# Patient Record
Sex: FEMALE | Race: WHITE | Employment: OTHER | URBAN - METROPOLITAN AREA
[De-identification: names, ages, dates, MRNs, and addresses within clinical notes are randomized per-mention and may not be internally consistent; named-entity substitution may affect disease eponyms.]

---

## 2017-10-05 PROBLEM — M81.0 SENILE OSTEOPOROSIS: Status: ACTIVE | Noted: 2017-10-05

## 2018-07-02 ENCOUNTER — APPOINTMENT (OUTPATIENT)
Dept: GENERAL RADIOLOGY | Age: 83
End: 2018-07-02
Attending: EMERGENCY MEDICINE
Payer: MEDICARE

## 2018-07-02 ENCOUNTER — HOSPITAL ENCOUNTER (EMERGENCY)
Age: 83
Discharge: HOME | End: 2018-07-02
Attending: EMERGENCY MEDICINE
Payer: MEDICARE

## 2018-07-02 ENCOUNTER — APPOINTMENT (OUTPATIENT)
Dept: CT IMAGING | Age: 83
End: 2018-07-02
Attending: EMERGENCY MEDICINE
Payer: MEDICARE

## 2018-07-02 VITALS
HEART RATE: 117 BPM | TEMPERATURE: 98 F | SYSTOLIC BLOOD PRESSURE: 136 MMHG | OXYGEN SATURATION: 92 % | HEIGHT: 65 IN | BODY MASS INDEX: 25.99 KG/M2 | DIASTOLIC BLOOD PRESSURE: 64 MMHG | RESPIRATION RATE: 15 BRPM | WEIGHT: 156 LBS

## 2018-07-02 DIAGNOSIS — R40.4 TRANSIENT ALTERATION OF AWARENESS: Primary | ICD-10-CM

## 2018-07-02 LAB
ALBUMIN SERPL-MCNC: 3.4 G/DL (ref 3.2–4.6)
ALBUMIN/GLOB SERPL: 0.9 {RATIO} (ref 1.2–3.5)
ALP SERPL-CCNC: 54 U/L (ref 50–136)
ALT SERPL-CCNC: 29 U/L (ref 12–65)
ANION GAP SERPL CALC-SCNC: 7 MMOL/L (ref 7–16)
AST SERPL-CCNC: 34 U/L (ref 15–37)
BACTERIA URNS QL MICRO: 0 /HPF
BASOPHILS # BLD: 0 K/UL (ref 0–0.2)
BASOPHILS NFR BLD: 0 % (ref 0–2)
BILIRUB SERPL-MCNC: 0.5 MG/DL (ref 0.2–1.1)
BUN SERPL-MCNC: 18 MG/DL (ref 8–23)
CALCIUM SERPL-MCNC: 8.9 MG/DL (ref 8.3–10.4)
CASTS URNS QL MICRO: 0 /LPF
CHLORIDE SERPL-SCNC: 105 MMOL/L (ref 98–107)
CO2 SERPL-SCNC: 29 MMOL/L (ref 21–32)
CREAT SERPL-MCNC: 1.28 MG/DL (ref 0.6–1)
DIFFERENTIAL METHOD BLD: ABNORMAL
EOSINOPHIL # BLD: 0.1 K/UL (ref 0–0.8)
EOSINOPHIL NFR BLD: 1 % (ref 0.5–7.8)
EPI CELLS #/AREA URNS HPF: NORMAL /HPF
ERYTHROCYTE [DISTWIDTH] IN BLOOD BY AUTOMATED COUNT: 13.8 % (ref 11.9–14.6)
GLOBULIN SER CALC-MCNC: 3.6 G/DL (ref 2.3–3.5)
GLUCOSE BLD STRIP.AUTO-MCNC: 188 MG/DL (ref 65–100)
GLUCOSE SERPL-MCNC: 84 MG/DL (ref 65–100)
HCT VFR BLD AUTO: 50.3 % (ref 35.8–46.3)
HGB BLD-MCNC: 16.9 G/DL (ref 11.7–15.4)
IMM GRANULOCYTES # BLD: 0 K/UL (ref 0–0.5)
IMM GRANULOCYTES NFR BLD AUTO: 0 % (ref 0–5)
LACTATE BLD-SCNC: 1.5 MMOL/L (ref 0.5–1.9)
LYMPHOCYTES # BLD: 2.3 K/UL (ref 0.5–4.6)
LYMPHOCYTES NFR BLD: 26 % (ref 13–44)
MAGNESIUM SERPL-MCNC: 2.7 MG/DL (ref 1.8–2.4)
MCH RBC QN AUTO: 32.6 PG (ref 26.1–32.9)
MCHC RBC AUTO-ENTMCNC: 33.6 G/DL (ref 31.4–35)
MCV RBC AUTO: 97.1 FL (ref 79.6–97.8)
MONOCYTES # BLD: 0.7 K/UL (ref 0.1–1.3)
MONOCYTES NFR BLD: 8 % (ref 4–12)
NEUTS SEG # BLD: 5.8 K/UL (ref 1.7–8.2)
NEUTS SEG NFR BLD: 65 % (ref 43–78)
PLATELET # BLD AUTO: 258 K/UL (ref 150–450)
PMV BLD AUTO: 11 FL (ref 10.8–14.1)
POTASSIUM SERPL-SCNC: 4.7 MMOL/L (ref 3.5–5.1)
PROCALCITONIN SERPL-MCNC: <0.1 NG/ML
PROT SERPL-MCNC: 7 G/DL (ref 6.3–8.2)
RBC # BLD AUTO: 5.18 M/UL (ref 4.05–5.25)
RBC #/AREA URNS HPF: NORMAL /HPF
SODIUM SERPL-SCNC: 141 MMOL/L (ref 136–145)
WBC # BLD AUTO: 8.9 K/UL (ref 4.3–11.1)
WBC URNS QL MICRO: NORMAL /HPF

## 2018-07-02 PROCEDURE — 85025 COMPLETE CBC W/AUTO DIFF WBC: CPT | Performed by: EMERGENCY MEDICINE

## 2018-07-02 PROCEDURE — 96360 HYDRATION IV INFUSION INIT: CPT | Performed by: EMERGENCY MEDICINE

## 2018-07-02 PROCEDURE — 83605 ASSAY OF LACTIC ACID: CPT

## 2018-07-02 PROCEDURE — 70450 CT HEAD/BRAIN W/O DYE: CPT

## 2018-07-02 PROCEDURE — 84145 PROCALCITONIN (PCT): CPT | Performed by: EMERGENCY MEDICINE

## 2018-07-02 PROCEDURE — 80053 COMPREHEN METABOLIC PANEL: CPT | Performed by: EMERGENCY MEDICINE

## 2018-07-02 PROCEDURE — 96361 HYDRATE IV INFUSION ADD-ON: CPT | Performed by: EMERGENCY MEDICINE

## 2018-07-02 PROCEDURE — 81003 URINALYSIS AUTO W/O SCOPE: CPT | Performed by: EMERGENCY MEDICINE

## 2018-07-02 PROCEDURE — 93005 ELECTROCARDIOGRAM TRACING: CPT | Performed by: EMERGENCY MEDICINE

## 2018-07-02 PROCEDURE — 81015 MICROSCOPIC EXAM OF URINE: CPT | Performed by: EMERGENCY MEDICINE

## 2018-07-02 PROCEDURE — 99285 EMERGENCY DEPT VISIT HI MDM: CPT | Performed by: EMERGENCY MEDICINE

## 2018-07-02 PROCEDURE — 71046 X-RAY EXAM CHEST 2 VIEWS: CPT

## 2018-07-02 PROCEDURE — 82962 GLUCOSE BLOOD TEST: CPT

## 2018-07-02 PROCEDURE — 83735 ASSAY OF MAGNESIUM: CPT | Performed by: EMERGENCY MEDICINE

## 2018-07-02 PROCEDURE — 74011250636 HC RX REV CODE- 250/636: Performed by: EMERGENCY MEDICINE

## 2018-07-02 RX ADMIN — SODIUM CHLORIDE 1000 ML: 900 INJECTION, SOLUTION INTRAVENOUS at 19:27

## 2018-07-02 NOTE — ED NOTES
Orthostatics completed. Patient denies experiencing any dizziness. Fluid bolus has been started. Cardiac monitoring and cycling vitals in place. Family at bedside. Will continue to monitor.

## 2018-07-02 NOTE — ED PROVIDER NOTES
HPI Comments: Presents with complaint of  Low blood pressure and difficulty getting her words out. Patient was at  Home and her neighbor came over and was visiting with her and noticed that she was having some difficulty with word finding was rubbing her head and said that she didn't feel right so they took her blood pressure and  It was low. Blood pressure 94/59 is the lowest number recorded. Patient has had 5-6 episodes of difficulty with word finding over the past 3 months per family member and primary care provider told family it was likely due to her age and memory. Patient's family member states she tries say something and then pauses and tightens up her mouth and then can come up with the word. Denies any focal weakness. Symptoms have never lasted for a prolonged period of time. She has an appointment with her cardiologist tomorrow. He was at home independently. Patient is a 80 y.o. female presenting with other event. The history is provided by the patient. Other   This is a new problem. The current episode started 3 to 5 hours ago. The problem occurs constantly. The problem has been gradually worsening. Pertinent negatives include no chest pain, no abdominal pain, no headaches and no shortness of breath. Nothing aggravates the symptoms. Nothing relieves the symptoms. She has tried nothing for the symptoms.         Past Medical History:   Diagnosis Date    Abdominal pain, generalized     Acute kidney injury ([address] 12/29/2013    Arthropathy, unspecified, site unspecified     Atrial fibrillation (HCC)     pacemaker    Bell's palsy     Benign hypertensive heart disease without heart failure     Bowel obstruction ([address] 8/20/2011    Chest pain, unspecified     Congestive heart failure, unspecified     Diaphragmatic hernia without mention of obstruction or gangrene     Disorders of bursae and tendons in shoulder region, unspecified     Diverticulosis     Dizziness and giddiness     Dysuria  Edema     Encounter for long-term (current) use of other medications     Endocrine disease     Flat foot(734)     Hemorrhage of rectum and anus     Hyperlipidemia     Hypertension     Hypertonicity of bladder     Hypothyroidism     Loss of height     Lumbago     Nonspecific abnormal finding in stool contents     Osteoporosis, unspecified     Other extrapyramidal disease and abnormal movement disorder     Other specified circulatory system disorders     Painful respiration     Premature menopause     Reflux esophagitis     Restless legs syndrome (RLS)     Slow transit constipation     Stricture and stenosis of esophagus     Thyroid disease     Unspecified gastritis and gastroduodenitis without mention of hemorrhage     Unspecified hemorrhoids with other complication     Unspecified hereditary and idiopathic peripheral neuropathy     Unspecified hypothyroidism     Unspecified menopausal and postmenopausal disorder     Urge incontinence        Past Surgical History:   Procedure Laterality Date    BREAST SURGERY PROCEDURE UNLISTED      cyst    HX APPENDECTOMY      HX CHOLECYSTECTOMY      HX HEENT      HX HYSTERECTOMY      HX PACEMAKER      HX TONSILLECTOMY      NEUROLOGICAL PROCEDURE UNLISTED      carpal tunnel both hands         Family History:   Problem Relation Age of Onset    Cancer Mother      Pancreatic    Heart Disease Mother     Heart Disease Father     Stroke Other     Diabetes Other     Cancer Other     Depression Child        Social History     Social History    Marital status:      Spouse name: N/A    Number of children: N/A    Years of education: N/A     Occupational History    Not on file.      Social History Main Topics    Smoking status: Never Smoker    Smokeless tobacco: Never Used    Alcohol use No    Drug use: No    Sexual activity: Not Currently     Other Topics Concern    Not on file     Social History Narrative         ALLERGIES: Erythromycin and Sulfa (sulfonamide antibiotics)    Review of Systems   Constitutional: Negative for chills and fever. Respiratory: Negative for shortness of breath. Cardiovascular: Negative for chest pain. Gastrointestinal: Negative for abdominal pain. Skin: Negative for rash and wound. Neurological: Negative for headaches. All other systems reviewed and are negative. Vitals:    07/02/18 1605 07/02/18 1813   BP: 113/72 115/74   Pulse: 83    Resp: 15    Temp: 98 °F (36.7 °C)    SpO2: 94% 95%   Weight: 70.8 kg (156 lb)    Height: 5' 5\" (1.651 m)             Physical Exam   Constitutional: She is oriented to person, place, and time. She appears well-developed and well-nourished. No distress. HENT:   Head: Normocephalic and atraumatic. Neck: Normal range of motion. Neck supple. Cardiovascular: Normal rate and regular rhythm. Murmur heard. Pulmonary/Chest: Effort normal and breath sounds normal. No respiratory distress. She has no wheezes. She has no rales. Abdominal: Soft. She exhibits no distension. There is no tenderness. There is no rebound and no guarding. Musculoskeletal: Normal range of motion. She exhibits no edema or tenderness. Neurological: She is alert and oriented to person, place, and time. No cranial nerve deficit. Coordination normal.   Skin: Skin is warm and dry. No rash noted. She is not diaphoretic. No erythema. Psychiatric: She has a normal mood and affect. Her behavior is normal.   Nursing note and vitals reviewed. MDM  Number of Diagnoses or Management Options  Transient alteration of awareness:   Diagnosis management comments: Patient states she's had a murmur in the past.  Her workup is negative except for mildly dehydrated as reflected by an elevated hemoglobin. She has refused Coumadin in the past.  She has an appointment with her cardiologist tomorrow. I am unsure if he's an echo this since 2013.   I discussed with the family that these symptoms have been going on intermittently for several months that the workup for TIAs can be done as an outpatient. This included an echo carotid Dopplers and MRI. she has a history of atrial fibrillation  Which her EKG shows currently. SHe is on a full dose aspirin but may need Plavix or something in addition  For anticoagulation given her refusal of Coumadin but  Probable TIAs. Again this is discussed with the family.   I think admission for TIA is not indicated if she is not going to take blood thinners plus she has 24 hour follow up       Amount and/or Complexity of Data Reviewed  Clinical lab tests: ordered and reviewed  Review and summarize past medical records: yes  Independent visualization of images, tracings, or specimens: yes    Risk of Complications, Morbidity, and/or Mortality  Presenting problems: high  Diagnostic procedures: moderate  Management options: high    Patient Progress  Patient progress: improved        ED Course       Procedures

## 2018-07-02 NOTE — ED TRIAGE NOTES
Patients daughter states the patients BP has been up and down all day today. Patient has a pacemaker and has a history of afib. Patients daughter states that she has had issues speaking a couple times today and that has been going on for about 3 months. Patient has no complaints at this time.

## 2018-07-03 LAB
ATRIAL RATE: 340 BPM
CALCULATED R AXIS, ECG10: 50 DEGREES
CALCULATED T AXIS, ECG11: -7 DEGREES
DIAGNOSIS, 93000: NORMAL
Q-T INTERVAL, ECG07: 334 MS
QRS DURATION, ECG06: 86 MS
QTC CALCULATION (BEZET), ECG08: 439 MS
VENTRICULAR RATE, ECG03: 104 BPM

## 2018-07-03 NOTE — DISCHARGE INSTRUCTIONS
Transient Ischemic Attack: After Your Visit to the Emergency Room  Your Care Instructions  Even though you have been released from the emergency room, you still need to watch for any problems. The doctor carefully checked you. But sometimes problems can develop later. If you have new symptoms, or if your symptoms do not get better, return to the emergency room or call your doctor right away. In a transient ischemic attack (TIA), the blood flow to your brain is blocked for a short time. You may have had vision problems, slurred speech, or other symptoms that are now gone. A stroke can cause symptoms similar to a TIA, but it causes lasting damage to your brain. A TIA is a warning that you may have a stroke in the future. If you think you are having another TIA, call 911 or seek other emergency help right away. Early treatment can help prevent a stroke. If you have other new symptoms, or if your symptoms do not get better, return to the emergency room or call your doctor right away. Prompt treatment may prevent long-term brain damage caused by a stroke. A visit to the emergency room is only one step in your treatment. Even if you feel better, you still need to do what your doctor recommends, such as going to all suggested follow-up appointments and taking medicines as directed. This will help you recover and help prevent future problems. How can you care for yourself at home? · Know the signs of a TIA or stroke. Ask family members or caregivers to watch for signs of a stroke, and make sure they know what to do if these signs appear. They may notice these signs before you do. · Take your medicines exactly as prescribed. Call your doctor if you think you are having a problem with your medicine. · Your doctor may advise you to take aspirin every day. Aspirin helps prevent strokes in people who have had a TIA.   · Do not take any over-the-counter medicines or herbal products without talking to your doctor first.  When should you call for help? Call 911 if:  · You have signs of a stroke, even if these symptoms go away after a few minutes. These may include:  ¨ Sudden numbness, paralysis, or weakness in your face, arm, or leg, especially on only one side of your body. ¨ New problems with walking or balance. ¨ Sudden vision changes. ¨ Drooling or slurred speech. ¨ New problems speaking or understanding simple statements, or feeling confused. ¨ A sudden, severe headache that is different from past headaches. · You feel like you are having another TIA. · You have signs of internal bleeding, especially if you are taking medicine to prevent blood clots, such as warfarin (Coumadin) or aspirin. These include:  ¨ You cough up blood. ¨ You vomit blood or what looks like coffee grounds. ¨ You pass maroon or very bloody stools. · You have other symptoms that you think are a medical emergency. Return to the emergency room now if:  · You have new symptoms that may be related to a TIA, such as falls or trouble swallowing. · You are taking aspirin or other medicine that prevents blood clots, and you have:  ¨ Severe vaginal bleeding. You are passing clots of blood and soaking through your usual pads or tampons every hour for 2 or more hours. ¨ New bruises or blood spots under your skin. ¨ A nosebleed. ¨ Bleeding gums when you brush your teeth. ¨ Blood in your urine. Where can you learn more? Go to [employer]  Enter W424 in the search box to learn more about \"Transient Ischemic Attack: After Your Visit to the Emergency Room. \"   © 4691-3530 [employer]. Care instructions adapted under license by [employer] (which disclaims liability or warranty for this information).  This care instruction is for use with your licensed healthcare professional. If you have questions about a medical condition or this instruction, always ask your healthcare professional. Tessie Pacheco disclaims any warranty or liability for your use of this information. Content Version: 9.4.69601;  Last Revised: October 31, 2011

## 2018-07-03 NOTE — ED NOTES
I have reviewed discharge instructions with the patient and caregiver. The patient and caregiver verbalized understanding. Patient left ED via Discharge Method: ambulatory to Home with self transport. The patient is ambulatory upon exit and appears in no acute distress. The patient has been provided discharge instructions and follow up information. The patient and daughter do not have any questions at this time. Opportunity for questions and clarification provided. Patient given 0 scripts. To continue your aftercare when you leave the hospital, you may receive an automated call from our care team to check in on how you are doing. This is a free service and part of our promise to provide the best care and service to meet your aftercare needs.  If you have questions, or wish to unsubscribe from this service please call [phone]. Thank you for Choosing our [employer] Emergency Department.

## 2020-02-15 ENCOUNTER — HOSPITAL ENCOUNTER (EMERGENCY)
Age: 85
Discharge: HOME | End: 2020-02-15
Attending: EMERGENCY MEDICINE
Payer: MEDICARE

## 2020-02-15 ENCOUNTER — APPOINTMENT (OUTPATIENT)
Dept: GENERAL RADIOLOGY | Age: 85
End: 2020-02-15
Attending: EMERGENCY MEDICINE
Payer: MEDICARE

## 2020-02-15 VITALS
OXYGEN SATURATION: 93 % | DIASTOLIC BLOOD PRESSURE: 79 MMHG | BODY MASS INDEX: 27.16 KG/M2 | SYSTOLIC BLOOD PRESSURE: 114 MMHG | HEART RATE: 108 BPM | WEIGHT: 163 LBS | HEIGHT: 65 IN | RESPIRATION RATE: 20 BRPM | TEMPERATURE: 98.1 F

## 2020-02-15 DIAGNOSIS — J18.9 ATYPICAL PNEUMONIA: ICD-10-CM

## 2020-02-15 DIAGNOSIS — I50.9 ACUTE ON CHRONIC CONGESTIVE HEART FAILURE, UNSPECIFIED HEART FAILURE TYPE (HCC): Primary | ICD-10-CM

## 2020-02-15 LAB
ALBUMIN SERPL-MCNC: 3.5 G/DL (ref 3.2–4.6)
ALBUMIN/GLOB SERPL: 0.9 {RATIO} (ref 1.2–3.5)
ALP SERPL-CCNC: 67 U/L (ref 50–136)
ALT SERPL-CCNC: 36 U/L (ref 12–65)
ANION GAP SERPL CALC-SCNC: 8 MMOL/L (ref 7–16)
AST SERPL-CCNC: 35 U/L (ref 15–37)
BASOPHILS # BLD: 0.1 K/UL (ref 0–0.2)
BASOPHILS NFR BLD: 1 % (ref 0–2)
BILIRUB SERPL-MCNC: 0.8 MG/DL (ref 0.2–1.1)
BNP SERPL-MCNC: 1922 PG/ML
BUN SERPL-MCNC: 17 MG/DL (ref 8–23)
CALCIUM SERPL-MCNC: 9.4 MG/DL (ref 8.3–10.4)
CHLORIDE SERPL-SCNC: 103 MMOL/L (ref 98–107)
CO2 SERPL-SCNC: 28 MMOL/L (ref 21–32)
CREAT SERPL-MCNC: 1.39 MG/DL (ref 0.6–1)
DIFFERENTIAL METHOD BLD: ABNORMAL
EOSINOPHIL # BLD: 0.2 K/UL (ref 0–0.8)
EOSINOPHIL NFR BLD: 2 % (ref 0.5–7.8)
ERYTHROCYTE [DISTWIDTH] IN BLOOD BY AUTOMATED COUNT: 13.3 % (ref 11.9–14.6)
GLOBULIN SER CALC-MCNC: 3.8 G/DL (ref 2.3–3.5)
GLUCOSE SERPL-MCNC: 193 MG/DL (ref 65–100)
HCT VFR BLD AUTO: 54.9 % (ref 35.8–46.3)
HGB BLD-MCNC: 17.6 G/DL (ref 11.7–15.4)
IMM GRANULOCYTES # BLD AUTO: 0.1 K/UL (ref 0–0.5)
IMM GRANULOCYTES NFR BLD AUTO: 0 % (ref 0–5)
LYMPHOCYTES # BLD: 1.8 K/UL (ref 0.5–4.6)
LYMPHOCYTES NFR BLD: 15 % (ref 13–44)
MCH RBC QN AUTO: 31.4 PG (ref 26.1–32.9)
MCHC RBC AUTO-ENTMCNC: 32.1 G/DL (ref 31.4–35)
MCV RBC AUTO: 98 FL (ref 79.6–97.8)
MONOCYTES # BLD: 0.7 K/UL (ref 0.1–1.3)
MONOCYTES NFR BLD: 6 % (ref 4–12)
NEUTS SEG # BLD: 9.5 K/UL (ref 1.7–8.2)
NEUTS SEG NFR BLD: 77 % (ref 43–78)
NRBC # BLD: 0 K/UL (ref 0–0.2)
PLATELET # BLD AUTO: 266 K/UL (ref 150–450)
PMV BLD AUTO: 10.8 FL (ref 9.4–12.3)
POTASSIUM SERPL-SCNC: 4.6 MMOL/L (ref 3.5–5.1)
PROT SERPL-MCNC: 7.3 G/DL (ref 6.3–8.2)
RBC # BLD AUTO: 5.6 M/UL (ref 4.05–5.2)
SODIUM SERPL-SCNC: 139 MMOL/L (ref 136–145)
WBC # BLD AUTO: 12.3 K/UL (ref 4.3–11.1)

## 2020-02-15 PROCEDURE — 99284 EMERGENCY DEPT VISIT MOD MDM: CPT

## 2020-02-15 PROCEDURE — 74011000250 HC RX REV CODE- 250: Performed by: EMERGENCY MEDICINE

## 2020-02-15 PROCEDURE — 96374 THER/PROPH/DIAG INJ IV PUSH: CPT

## 2020-02-15 PROCEDURE — 71045 X-RAY EXAM CHEST 1 VIEW: CPT

## 2020-02-15 PROCEDURE — 85025 COMPLETE CBC W/AUTO DIFF WBC: CPT

## 2020-02-15 PROCEDURE — 80053 COMPREHEN METABOLIC PANEL: CPT

## 2020-02-15 PROCEDURE — 74011250636 HC RX REV CODE- 250/636: Performed by: EMERGENCY MEDICINE

## 2020-02-15 PROCEDURE — 83880 ASSAY OF NATRIURETIC PEPTIDE: CPT

## 2020-02-15 PROCEDURE — 94640 AIRWAY INHALATION TREATMENT: CPT

## 2020-02-15 RX ORDER — FUROSEMIDE 10 MG/ML
20 INJECTION INTRAMUSCULAR; INTRAVENOUS
Status: COMPLETED | OUTPATIENT
Start: 2020-02-15 | End: 2020-02-15

## 2020-02-15 RX ORDER — LEVOFLOXACIN 750 MG/1
750 TABLET ORAL DAILY
Qty: 7 TAB | Refills: 0 | Status: SHIPPED | OUTPATIENT
Start: 2020-02-15 | End: 2020-05-20 | Stop reason: ALTCHOICE

## 2020-02-15 RX ORDER — LEVALBUTEROL INHALATION SOLUTION 1.25 MG/3ML
1.25 SOLUTION RESPIRATORY (INHALATION)
Status: COMPLETED | OUTPATIENT
Start: 2020-02-15 | End: 2020-02-15

## 2020-02-15 RX ADMIN — LEVALBUTEROL HYDROCHLORIDE 1.25 MG: 1.25 SOLUTION RESPIRATORY (INHALATION) at 04:07

## 2020-02-15 RX ADMIN — FUROSEMIDE 20 MG: 10 INJECTION, SOLUTION INTRAMUSCULAR; INTRAVENOUS at 04:23

## 2020-02-15 NOTE — CONSULTS
Hospitalist Consult Note    History of Present Illness:  51-year-old female presenting to the emergency department via EMS after waking up acutely short of breath in the middle of the night. Patient states that she has had a 2-week course of generalized malaise and nonproductive cough. Patient denies fever, chills, cold sweats, nausea, vomiting, diarrhea, rash pleuritic chest pain, exertional chest pain, dyspnea on exertion, orthopnea, PND, lower extremity edema. In the emergency department the patient was noted to have heart rate of 103, blood pressure of 137/64, oxygen saturations greater than 93% on room air, no fever. Very mild leukocytosis at 12.3 with neutrophil predominance. BNP of 1920 but no prior baseline for comparison. Creatinine within the range of normal.  Patient was treated with furosemide IV. Comprehensive review of systems negative unless stated above. Past Medical History:  Paroxysmal atrial fibrillation on rivaroxaban, amiodarone with pacemaker  History of HFpEF  HTN  HLD  Hypothyroidism    Past Surgical History:  Pacemaker placement  Hysterectomy  Cholecystectomy  Appendectomy  Tonsillectomy    Family History: Mother: Pancreatic cancer, CAD  Father: CAD    Social History:  Lives at home by herself, retired,   Never smoker, denies alcohol or illicits    Physical Exam:  General: Elderly white female, sitting up in bed, no acute distress  HEENT: NCAT, moist mucous membranes  Skin: No rash noted  Cardio: RRR, normal S1/S2, no rubs, no gallops, no murmurs, no JVD  Pulm: Non labored respirations on room air, LCAB, no wheezing, no rales, no rhonchi  GI: Soft, Nt, Nd, Nml bowel sounds, no masses noted  Extremity: Atraumatic, no deformities, no edema  Neuro: Alert, oriented, moving all extremities, no focal deficits noted  Psych: Pleasant, cooperative, normal range of affect    I have personally reviewed all current data for this patient.     Assessment and Plan:    #Interstitial pneumonia: Patient presented with a two-week history of productive cough and acute onset of shortness of breath this morning. Patient's vital signs are unremarkable including normal oxygen saturation on room air. Physical exam with clear lungs, no JVD, no lower extremity edema, frequent dry cough. Chest x-ray with asymmetrical interstitial opacities on the right consistent with interstitial pneumonia.   Mild leukocytosis with neutrophil predominance.  -Patient is safe for discharge and close outpatient follow-up with primary care physician on Monday  -Patient given very strict return precautions to come back to the emergency department if she has worsening shortness of breath, chest pain  -Patient has a neighbor who keeps a close eye on her and a daughter who lives in the immediate area  -Discussed with emergency physician agrees to send the patient home with levofloxacin for interstitial pneumonia  -Discussed the discharge plan at length with the patient, daughter, neighbor and all voiced understanding and agreement    Safe for discharge home

## 2020-02-15 NOTE — DISCHARGE INSTRUCTIONS
Patient Education        Pneumonia: Care Instructions  Your Care Instructions    Pneumonia is an infection of the lungs. Most cases are caused by infections from bacteria or viruses. Pneumonia may be mild or very severe. If it is caused by bacteria, you will be treated with antibiotics. It may take a few weeks to a few months to recover fully from pneumonia, depending on how sick you were and whether your overall health is good. Follow-up care is a key part of your treatment and safety. Be sure to make and go to all appointments, and call your doctor if you are having problems. It's also a good idea to know your test results and keep a list of the medicines you take. How can you care for yourself at home? · Take your antibiotics exactly as directed. Do not stop taking the medicine just because you are feeling better. You need to take the full course of antibiotics. · Take your medicines exactly as prescribed. Call your doctor if you think you are having a problem with your medicine. · Get plenty of rest and sleep. You may feel weak and tired for a while, but your energy level will improve with time. · To prevent dehydration, drink plenty of fluids, enough so that your urine is light yellow or clear like water. Choose water and other caffeine-free clear liquids until you feel better. If you have kidney, heart, or liver disease and have to limit fluids, talk with your doctor before you increase the amount of fluids you drink. · Take care of your cough so you can rest. A cough that brings up mucus from your lungs is common with pneumonia. It is one way your body gets rid of the infection. But if coughing keeps you from resting or causes severe fatigue and chest-wall pain, talk to your doctor. He or she may suggest that you take a medicine to reduce the cough. · Use a vaporizer or humidifier to add moisture to your bedroom. Follow the directions for cleaning the machine.   · Do not smoke or allow others to smoke around you. Smoke will make your cough last longer. If you need help quitting, talk to your doctor about stop-smoking programs and medicines. These can increase your chances of quitting for good. · Take an over-the-counter pain medicine, such as acetaminophen (Tylenol), ibuprofen (Advil, Motrin), or naproxen (Aleve). Read and follow all instructions on the label. · Do not take two or more pain medicines at the same time unless the doctor told you to. Many pain medicines have acetaminophen, which is Tylenol. Too much acetaminophen (Tylenol) can be harmful. · If you were given a spirometer to measure how well your lungs are working, use it as instructed. This can help your doctor tell how your recovery is going. · To prevent pneumonia in the future, talk to your doctor about getting a flu vaccine (once a year) and a pneumococcal vaccine (one time only for most people). When should you call for help? Call 911 anytime you think you may need emergency care. For example, call if:    · You have severe trouble breathing.    Call your doctor now or seek immediate medical care if:    · You cough up dark brown or bloody mucus (sputum).     · You have new or worse trouble breathing.     · You are dizzy or lightheaded, or you feel like you may faint.    Watch closely for changes in your health, and be sure to contact your doctor if:    · You have a new or higher fever.     · You are coughing more deeply or more often.     · You are not getting better after 2 days (48 hours).     · You do not get better as expected. Where can you learn more? Go to http://priscila-manuel.info/. Enter [id number] in the search box to learn more about \"Pneumonia: Care Instructions. \"  Current as of: June 9, 2019  Content Version: 12.2  © 3994-4140 [employer]. Care instructions adapted under license by [employer] (which disclaims liability or warranty for this information).  If you have questions about a medical condition or this instruction, always ask your healthcare professional. [place] [address] any warranty or liability for your use of this information.

## 2020-02-15 NOTE — ED PROVIDER NOTES
59-year-old female with a history of chronic kidney disease, A. fib and congestive heart failure presenting for worsening shortness of breath. With cough    The history is provided by the patient. Cough   This is a new problem. The current episode started more than 1 week ago. The problem occurs constantly. The problem has been gradually worsening. The cough is non-productive. There has been no fever. The fever has been present for less than 1 day. Associated symptoms include shortness of breath. Pertinent negatives include no chest pain, no chills, no sweats, no weight loss, no eye redness, no ear congestion, no ear pain, no headaches, no rhinorrhea, no sore throat, no myalgias, no wheezing, no nausea, no vomiting and no confusion. She has tried nothing for the symptoms. The treatment provided no relief. She is not a smoker. Her past medical history is significant for CHF.         Past Medical History:   Diagnosis Date    Acute kidney injury ([facility] [address] 12/29/2013    Atrial fibrillation (HCC)     pacemaker    Bell's palsy     Benign hypertensive heart disease without heart failure     Bowel obstruction (HCC) 8/20/2011    Congestive heart failure, unspecified     Diverticulosis     Flat foot(734)     Hemorrhage of rectum and anus     Hiatal hernia     Hyperlipidemia     Hypertension     Hypertonicity of bladder     Hypothyroidism     Loss of height     Menopause     Osteoporosis     Reflux esophagitis     Restless legs syndrome (RLS)     Slow transit constipation     Stricture and stenosis of esophagus     Unspecified gastritis and gastroduodenitis without mention of hemorrhage     Unspecified hemorrhoids with other complication     Unspecified hereditary and idiopathic peripheral neuropathy     Urge incontinence        Past Surgical History:   Procedure Laterality Date    BREAST SURGERY PROCEDURE UNLISTED      cyst    HX APPENDECTOMY      HX CHOLECYSTECTOMY      HX HEENT      HX HYSTERECTOMY      HX PACEMAKER      HX TONSILLECTOMY      NEUROLOGICAL PROCEDURE UNLISTED      carpal tunnel both hands         Family History:   Problem Relation Age of Onset    Cancer Mother         Pancreatic    Heart Disease Mother     Heart Disease Father     Stroke Other     Diabetes Other     Cancer Other     Depression Child        Social History     Socioeconomic History    Marital status:      Spouse name: Not on file    Number of children: Not on file    Years of education: Not on file    Highest education level: Not on file   Occupational History    Not on file   Social Needs    Financial resource strain: Not on file    Food insecurity:     Worry: Not on file     Inability: Not on file    Transportation needs:     Medical: Not on file     Non-medical: Not on file   Tobacco Use    Smoking status: Never Smoker    Smokeless tobacco: Never Used   Substance and Sexual Activity    Alcohol use: No    Drug use: No    Sexual activity: Not Currently   Lifestyle    Physical activity:     Days per week: Not on file     Minutes per session: Not on file    Stress: Not on file   Relationships    Social connections:     Talks on phone: Not on file     Gets together: Not on file     Attends Scientologist service: Not on file     Active member of club or organization: Not on file     Attends meetings of clubs or organizations: Not on file     Relationship status: Not on file    Intimate partner violence:     Fear of current or ex partner: Not on file     Emotionally abused: Not on file     Physically abused: Not on file     Forced sexual activity: Not on file   Other Topics Concern    Not on file   Social History Narrative    Not on file         ALLERGIES: Erythromycin and Sulfa (sulfonamide antibiotics)    Review of Systems   Constitutional: Negative for chills and weight loss. HENT: Negative for ear pain, rhinorrhea and sore throat. Eyes: Negative for redness.    Respiratory: Positive for cough and shortness of breath. Negative for wheezing. Cardiovascular: Negative for chest pain. Gastrointestinal: Negative for nausea and vomiting. Musculoskeletal: Negative for myalgias. Neurological: Negative for headaches. Psychiatric/Behavioral: Negative for confusion. All other systems reviewed and are negative. Vitals:    02/15/20 0309 02/15/20 0409 02/15/20 0423   BP: 164/80  137/64   Pulse: 99  (!) 103   Resp: 20     Temp: 98.1 °F (36.7 °C)     SpO2: 97% 95%    Weight: 73.9 kg (163 lb)     Height: 5' 5\" (1.651 m)              Physical Exam  Vitals signs and nursing note reviewed. Constitutional:       Appearance: She is well-developed. HENT:      Head: Normocephalic and atraumatic. Eyes:      Conjunctiva/sclera: Conjunctivae normal.      Pupils: Pupils are equal, round, and reactive to light. Neck:      Musculoskeletal: Neck supple. Cardiovascular:      Rate and Rhythm: Normal rate and regular rhythm. Pulmonary:      Effort: Pulmonary effort is normal.      Breath sounds: Normal breath sounds. Comments: coarse breath sounds bilaterally  Abdominal:      General: Bowel sounds are normal.      Palpations: Abdomen is soft. Musculoskeletal: Normal range of motion. Skin:     General: Skin is warm and dry. Neurological:      Mental Status: She is alert and oriented to person, place, and time. MDM  Number of Diagnoses or Management Options  Acute on chronic congestive heart failure, unspecified heart failure type [facility]):   Diagnosis management comments: 51-year-old female presenting for worsening shortness of breath.   EKG shows the patient's in chronic A. fib with a rate of 95  Check basic labs and a chest x-ray       Amount and/or Complexity of Data Reviewed  Clinical lab tests: ordered and reviewed (Results for orders placed or performed during the hospital encounter of 02/15/20  -CBC WITH AUTOMATED DIFF       Result                      Value             Ref Range           WBC                         12.3 (H)          4.3 - 11.1 K*       RBC                         5.60 (H)          4.05 - 5.2 M*       HGB                         17.6 (H)          11.7 - 15.4 *       HCT                         54.9 (H)          35.8 - 46.3 %       MCV                         98.0 (H)          79.6 - 97.8 *       MCH                         31.4              26.1 - 32.9 *       MCHC                        32.1              31.4 - 35.0 *       RDW                         13.3              11.9 - 14.6 %       PLATELET                    266               150 - 450 K/*       MPV                         10.8              9.4 - 12.3 FL       ABSOLUTE NRBC               0.00              0.0 - 0.2 K/*       DF                          AUTOMATED                             NEUTROPHILS                 77                43 - 78 %           LYMPHOCYTES                 15                13 - 44 %           MONOCYTES                   6                 4.0 - 12.0 %        EOSINOPHILS                 2                 0.5 - 7.8 %         BASOPHILS                   1                 0.0 - 2.0 %         IMMATURE GRANULOCYTES       0                 0.0 - 5.0 %         ABS. NEUTROPHILS            9.5 (H)           1.7 - 8.2 K/*       ABS. LYMPHOCYTES            1.8               0.5 - 4.6 K/*       ABS. MONOCYTES              0.7               0.1 - 1.3 K/*       ABS. EOSINOPHILS            0.2               0.0 - 0.8 K/*       ABS. BASOPHILS              0.1               0.0 - 0.2 K/*       ABS. IMM.  GRANS.            0.1               0.0 - 0.5 K/*  -METABOLIC PANEL, COMPREHENSIVE       Result                      Value             Ref Range           Sodium                      139               136 - 145 mm*       Potassium                   4.6               3.5 - 5.1 mm*       Chloride                    103               98 - 107 mmo*       CO2                         28                21 - 32 mmol*       Anion gap                   8                 7 - 16 mmol/L       Glucose                     193 (H)           65 - 100 mg/*       BUN                         17                8 - 23 MG/DL        Creatinine                  1.39 (H)          0.6 - 1.0 MG*       GFR est AA                  45 (L)            >60 ml/min/1*       GFR est non-AA              37 (L)            >60 ml/min/1*       Calcium                     9.4               8.3 - 10.4 M*       Bilirubin, total            0.8               0.2 - 1.1 MG*       ALT (SGPT)                  36                12 - 65 U/L         AST (SGOT)                  35                15 - 37 U/L         Alk. phosphatase            67                50 - 136 U/L        Protein, total              7.3               6.3 - 8.2 g/*       Albumin                     3.5               3.2 - 4.6 g/*       Globulin                    3.8 (H)           2.3 - 3.5 g/*       A-G Ratio                   0.9 (L)           1.2 - 3.5      -NT-PRO BNP       Result                      Value             Ref Range           NT pro-BNP                  1,922 (H)         <450 PG/ML     )  Tests in the radiology section of CPT®: ordered and reviewed  Decide to obtain previous medical records or to obtain history from someone other than the patient: yes  Discuss the patient with other providers: yes (Discussed the case with the hospitalist who will assume care)  Independent visualization of images, tracings, or specimens: yes    Risk of Complications, Morbidity, and/or Mortality  Presenting problems: high  Diagnostic procedures: high  Management options: high  General comments: I personally reviewed the patient's vital signs, laboratory tests, and/or radiological findings. I discussed these findings with the patient and their significance.   I answered all questions and explained that given these findings there is significant concern for increased morbidity and/or mortality without immediate intervention. As a result, I recommended admission to the hospital, consulted the appropriate service, and transitioned care to that service in improved condition      Patient Progress  Patient progress: improved    ED Course as of Feb 15 0429   Sat Feb 15, 2020   0356 Patient slightly hemoconcentrated. [JS]   2201 Reviewed the patient's chest x-ray and she has significant pulmonary edema likely the source of her shortness of breath.     [JS]      ED Course User Index  [JS] Jerson Owen MD       Procedures

## 2020-02-15 NOTE — ED TRIAGE NOTES
Pt arrives from home via [facility] with daughter and neighbor with c/o cough that has lasted 2-3 months. Pt called daughter and told her she was having trouble breathing. On EMS arrival, pt had an SaO2 of 92% and pt was speaking in full sentences. Pt was placed on O2 for comfort, SaO2 at 96%. Pt presents with no obvious work of breathing, chest rise symmetrical, SaO2 94% on room air,pt has slight non-productive cough, lung field cleat jagjit.

## 2020-02-15 NOTE — ED NOTES
I have reviewed discharge instructions with the patient. The patient verbalized understanding. Patient left ED via Discharge Method: wheelchair to Home with family. Opportunity for questions and clarification provided. Patient given 1 scripts. To continue your aftercare when you leave the hospital, you may receive an automated call from our care team to check in on how you are doing. This is a free service and part of our promise to provide the best care and service to meet your aftercare needs.  If you have questions, or wish to unsubscribe from this service please call [phone]. Thank you for Choosing our [address] [place] [address] Emergency Department.

## 2020-03-16 ENCOUNTER — APPOINTMENT (OUTPATIENT)
Dept: GENERAL RADIOLOGY | Age: 85
End: 2020-03-16
Attending: EMERGENCY MEDICINE
Payer: MEDICARE

## 2020-03-16 ENCOUNTER — HOSPITAL ENCOUNTER (EMERGENCY)
Age: 85
Discharge: HOME | End: 2020-03-16
Attending: EMERGENCY MEDICINE
Payer: MEDICARE

## 2020-03-16 VITALS
OXYGEN SATURATION: 97 % | BODY MASS INDEX: 26.2 KG/M2 | WEIGHT: 163 LBS | HEART RATE: 100 BPM | HEIGHT: 66 IN | SYSTOLIC BLOOD PRESSURE: 136 MMHG | TEMPERATURE: 98.1 F | DIASTOLIC BLOOD PRESSURE: 76 MMHG | RESPIRATION RATE: 21 BRPM

## 2020-03-16 DIAGNOSIS — M54.9 MECHANICAL BACK PAIN: Primary | ICD-10-CM

## 2020-03-16 LAB
ANION GAP SERPL CALC-SCNC: 5 MMOL/L (ref 7–16)
BACTERIA URNS QL MICRO: 0 /HPF
BASOPHILS # BLD: 0 K/UL (ref 0–0.2)
BASOPHILS NFR BLD: 0 % (ref 0–2)
BUN SERPL-MCNC: 17 MG/DL (ref 8–23)
CALCIUM SERPL-MCNC: 8.9 MG/DL (ref 8.3–10.4)
CASTS URNS QL MICRO: NORMAL /LPF
CHLORIDE SERPL-SCNC: 107 MMOL/L (ref 98–107)
CO2 SERPL-SCNC: 27 MMOL/L (ref 21–32)
CREAT SERPL-MCNC: 1.15 MG/DL (ref 0.6–1)
CRP SERPL-MCNC: 0.6 MG/DL (ref 0–0.9)
CRYSTALS URNS QL MICRO: 0 /LPF
DIFFERENTIAL METHOD BLD: ABNORMAL
EOSINOPHIL # BLD: 0.1 K/UL (ref 0–0.8)
EOSINOPHIL NFR BLD: 1 % (ref 0.5–7.8)
EPI CELLS #/AREA URNS HPF: NORMAL /HPF
ERYTHROCYTE [DISTWIDTH] IN BLOOD BY AUTOMATED COUNT: 13.5 % (ref 11.9–14.6)
GLUCOSE SERPL-MCNC: 139 MG/DL (ref 65–100)
HCT VFR BLD AUTO: 47.7 % (ref 35.8–46.3)
HGB BLD-MCNC: 15.8 G/DL (ref 11.7–15.4)
IMM GRANULOCYTES # BLD AUTO: 0.1 K/UL (ref 0–0.5)
IMM GRANULOCYTES NFR BLD AUTO: 1 % (ref 0–5)
LYMPHOCYTES # BLD: 1 K/UL (ref 0.5–4.6)
LYMPHOCYTES NFR BLD: 10 % (ref 13–44)
MCH RBC QN AUTO: 31.9 PG (ref 26.1–32.9)
MCHC RBC AUTO-ENTMCNC: 33.1 G/DL (ref 31.4–35)
MCV RBC AUTO: 96.2 FL (ref 79.6–97.8)
MONOCYTES # BLD: 0.8 K/UL (ref 0.1–1.3)
MONOCYTES NFR BLD: 8 % (ref 4–12)
MUCOUS THREADS URNS QL MICRO: 0 /LPF
NEUTS SEG # BLD: 7.9 K/UL (ref 1.7–8.2)
NEUTS SEG NFR BLD: 80 % (ref 43–78)
NRBC # BLD: 0 K/UL (ref 0–0.2)
OTHER OBSERVATIONS,UCOM: NORMAL
PLATELET # BLD AUTO: 232 K/UL (ref 150–450)
PMV BLD AUTO: 10.7 FL (ref 9.4–12.3)
POTASSIUM SERPL-SCNC: 5.1 MMOL/L (ref 3.5–5.1)
RBC # BLD AUTO: 4.96 M/UL (ref 4.05–5.2)
RBC #/AREA URNS HPF: 0 /HPF
SODIUM SERPL-SCNC: 139 MMOL/L (ref 136–145)
WBC # BLD AUTO: 9.9 K/UL (ref 4.3–11.1)
WBC URNS QL MICRO: NORMAL /HPF

## 2020-03-16 PROCEDURE — 86140 C-REACTIVE PROTEIN: CPT

## 2020-03-16 PROCEDURE — 96374 THER/PROPH/DIAG INJ IV PUSH: CPT

## 2020-03-16 PROCEDURE — 99285 EMERGENCY DEPT VISIT HI MDM: CPT

## 2020-03-16 PROCEDURE — 72100 X-RAY EXAM L-S SPINE 2/3 VWS: CPT

## 2020-03-16 PROCEDURE — 72070 X-RAY EXAM THORAC SPINE 2VWS: CPT

## 2020-03-16 PROCEDURE — 74011250636 HC RX REV CODE- 250/636: Performed by: EMERGENCY MEDICINE

## 2020-03-16 PROCEDURE — 80048 BASIC METABOLIC PNL TOTAL CA: CPT

## 2020-03-16 PROCEDURE — 96375 TX/PRO/DX INJ NEW DRUG ADDON: CPT

## 2020-03-16 PROCEDURE — 85025 COMPLETE CBC W/AUTO DIFF WBC: CPT

## 2020-03-16 PROCEDURE — 81015 MICROSCOPIC EXAM OF URINE: CPT

## 2020-03-16 RX ORDER — ONDANSETRON 2 MG/ML
4 INJECTION INTRAMUSCULAR; INTRAVENOUS
Status: COMPLETED | OUTPATIENT
Start: 2020-03-16 | End: 2020-03-16

## 2020-03-16 RX ORDER — MORPHINE SULFATE 2 MG/ML
2 INJECTION, SOLUTION INTRAMUSCULAR; INTRAVENOUS
Status: COMPLETED | OUTPATIENT
Start: 2020-03-16 | End: 2020-03-16

## 2020-03-16 RX ORDER — HYDROCODONE BITARTRATE AND ACETAMINOPHEN 5; 325 MG/1; MG/1
1 TABLET ORAL
Qty: 12 TAB | Refills: 0 | Status: SHIPPED | OUTPATIENT
Start: 2020-03-16 | End: 2020-03-19

## 2020-03-16 RX ADMIN — ONDANSETRON 4 MG: 2 INJECTION INTRAMUSCULAR; INTRAVENOUS at 13:50

## 2020-03-16 RX ADMIN — MORPHINE SULFATE 2 MG: 2 INJECTION, SOLUTION INTRAMUSCULAR; INTRAVENOUS at 13:50

## 2020-03-16 NOTE — DISCHARGE INSTRUCTIONS

## 2020-03-16 NOTE — ED NOTES
Discharge instructions reviewed with pt. Pt verbalized understanding. One prescription provided. Pt discharged via wheelchair with daughter.

## 2020-03-16 NOTE — PROGRESS NOTES
Referral to [facility]. RN and PT. Patient is living with her daughter at this time. Daughter states she has all her DME at her home as well.      Daughter: Frank Marina [phone]  [facility]

## 2020-03-16 NOTE — PROGRESS NOTES
CM chart review. PCP - Dr. Edilson Navarro [facility]) - last office visit noted on 2-17-20.

## 2020-03-16 NOTE — ED TRIAGE NOTES
Pt brought in by EMS from home with c/o lower back pain, denies any recent falls and numbness, fevers and chills. Pt states fell couple years ago in the same location. Pt alert and oriented x4, pt respirations even and unlabored. EMS states gave 50mcg fentanyl.

## 2020-03-16 NOTE — ED NOTES
2L oxygen via NC placed on patient at this time. As pt received pain medication and desat to 90% RA.  Dr. Monte Likes aware

## 2020-03-16 NOTE — ED PROVIDER NOTES
Patient presents from home, where she lives alone, by EMS, with a complaint of sudden onset of low back pain spontaneously 2 days ago. The pain is gotten progressively worse and she currently rates the pain is 8/10 in intensity. She describes the pain is across the lower back and sharp in nature and worse with movement. She denies any fever or chills or abdominal pain and she denies any radiation of the pain into the lower extremities. She denies any paresthesias or loss of control of bowel or bladder function. Patient has a history of compression fractures of the lumbar spine but denies any falls or injuries and review of systems is otherwise negative. The history is provided by the patient. Back Pain    This is a new problem. The current episode started 2 days ago. The problem has not changed since onset. The problem occurs constantly. Patient reports not work related injury. The pain is associated with no known injury. Pain location: Thoracolumbar area. The quality of the pain is described as sharp. The pain does not radiate. The pain is at a severity of 8/10. The pain is severe. The symptoms are aggravated by bending and twisting. The pain is the same all the time. Pertinent negatives include no chest pain, no fever, no numbness, no weight loss, no headaches, no abdominal pain, no abdominal swelling, no bowel incontinence, no perianal numbness, no bladder incontinence, no dysuria, no pelvic pain, no leg pain, no paresthesias, no paresis, no tingling and no weakness. She has tried analgesics for the symptoms. The treatment provided no relief. Risk factors include a history of osteoporosis.  Kyphoplasty       Past Medical History:   Diagnosis Date    Acute kidney injury ([address] 12/29/2013    Atrial fibrillation (HCC)     pacemaker    Bell's palsy     Benign hypertensive heart disease without heart failure     Bowel obstruction ([address] 8/20/2011    Congestive heart failure, unspecified     Diverticulosis  Flat foot(734)     Hemorrhage of rectum and anus     Hiatal hernia     Hyperlipidemia     Hypertension     Hypertonicity of bladder     Hypothyroidism     Loss of height     Menopause     Osteoporosis     Reflux esophagitis     Restless legs syndrome (RLS)     Slow transit constipation     Stricture and stenosis of esophagus     Unspecified gastritis and gastroduodenitis without mention of hemorrhage     Unspecified hemorrhoids with other complication     Unspecified hereditary and idiopathic peripheral neuropathy     Urge incontinence        Past Surgical History:   Procedure Laterality Date    BREAST SURGERY PROCEDURE UNLISTED      cyst    HX APPENDECTOMY      HX CHOLECYSTECTOMY      HX HEENT      HX HYSTERECTOMY      HX PACEMAKER      HX TONSILLECTOMY      NEUROLOGICAL PROCEDURE UNLISTED      carpal tunnel both hands         Family History:   Problem Relation Age of Onset    Cancer Mother         Pancreatic    Heart Disease Mother     Heart Disease Father     Stroke Other     Diabetes Other     Cancer Other     Depression Child        Social History     Socioeconomic History    Marital status:      Spouse name: Not on file    Number of children: Not on file    Years of education: Not on file    Highest education level: Not on file   Occupational History    Not on file   Social Needs    Financial resource strain: Not on file    Food insecurity     Worry: Not on file     Inability: Not on file    Transportation needs     Medical: Not on file     Non-medical: Not on file   Tobacco Use    Smoking status: Never Smoker    Smokeless tobacco: Never Used   Substance and Sexual Activity    Alcohol use: No    Drug use: No    Sexual activity: Not Currently   Lifestyle    Physical activity     Days per week: Not on file     Minutes per session: Not on file    Stress: Not on file   Relationships    Social connections     Talks on phone: Not on file     Gets together: Not on file     Attends Muslim service: Not on file     Active member of club or organization: Not on file     Attends meetings of clubs or organizations: Not on file     Relationship status: Not on file    Intimate partner violence     Fear of current or ex partner: Not on file     Emotionally abused: Not on file     Physically abused: Not on file     Forced sexual activity: Not on file   Other Topics Concern    Not on file   Social History Narrative    Not on file         ALLERGIES: Erythromycin and Sulfa (sulfonamide antibiotics)    Review of Systems   Constitutional: Negative for fever and weight loss. Cardiovascular: Negative for chest pain. Gastrointestinal: Negative for abdominal pain and bowel incontinence. Genitourinary: Negative for bladder incontinence, dysuria and pelvic pain. Musculoskeletal: Positive for back pain. Neurological: Negative for tingling, weakness, numbness, headaches and paresthesias. All other systems reviewed and are negative. Vitals:    03/16/20 1331 03/16/20 1333   BP: (!) 163/92    Pulse: 89    Resp: 21    Temp:  98.1 °F (36.7 °C)   SpO2: 91%    Weight:  73.9 kg (163 lb)   Height:  5' 6\" (1.676 m)            Physical Exam  Vitals signs and nursing note reviewed. Constitutional:       General: She is not in acute distress. Appearance: Normal appearance. She is normal weight. She is not ill-appearing, toxic-appearing or diaphoretic. HENT:      Head: Normocephalic and atraumatic. Nose: Nose normal. No congestion or rhinorrhea. Mouth/Throat:      Mouth: Mucous membranes are moist.      Pharynx: Oropharynx is clear. No oropharyngeal exudate or posterior oropharyngeal erythema. Eyes:      Extraocular Movements: Extraocular movements intact. Conjunctiva/sclera: Conjunctivae normal.      Pupils: Pupils are equal, round, and reactive to light. Neck:      Musculoskeletal: Normal range of motion and neck supple.  No neck rigidity or muscular tenderness. Cardiovascular:      Rate and Rhythm: Normal rate and regular rhythm. Pulses: Normal pulses. Heart sounds: Normal heart sounds. Pulmonary:      Effort: Pulmonary effort is normal.      Breath sounds: Normal breath sounds. Abdominal:      General: Abdomen is flat. Bowel sounds are normal. There is no distension. Palpations: Abdomen is soft. There is no mass. Tenderness: There is no abdominal tenderness. There is no right CVA tenderness or guarding. Musculoskeletal: Normal range of motion. Lymphadenopathy:      Cervical: No cervical adenopathy. Skin:     General: Skin is warm and dry. Capillary Refill: Capillary refill takes less than 2 seconds. Neurological:      General: No focal deficit present. Mental Status: She is alert and oriented to person, place, and time. Mental status is at baseline.    Psychiatric:         Mood and Affect: Mood normal.         Behavior: Behavior normal.          MDM  Number of Diagnoses or Management Options     Amount and/or Complexity of Data Reviewed  Clinical lab tests: ordered and reviewed  Tests in the radiology section of CPT®: ordered and reviewed  Review and summarize past medical records: yes  Independent visualization of images, tracings, or specimens: yes    Risk of Complications, Morbidity, and/or Mortality  Presenting problems: high  Diagnostic procedures: moderate  Management options: moderate    Patient Progress  Patient progress: stable         Procedures

## 2020-03-17 ENCOUNTER — HOME HEALTH ADMISSION (OUTPATIENT)
Dept: HOME HEALTH SERVICES | Facility: HOME HEALTH | Age: 85
End: 2020-03-17
Payer: MEDICARE

## 2020-03-18 ENCOUNTER — HOME CARE VISIT (OUTPATIENT)
Dept: SCHEDULING | Facility: HOME HEALTH | Age: 85
End: 2020-03-18
Payer: MEDICARE

## 2020-03-18 VITALS
TEMPERATURE: 97.6 F | SYSTOLIC BLOOD PRESSURE: 138 MMHG | RESPIRATION RATE: 16 BRPM | HEART RATE: 78 BPM | DIASTOLIC BLOOD PRESSURE: 78 MMHG

## 2020-03-18 VITALS
OXYGEN SATURATION: 91 % | HEART RATE: 81 BPM | SYSTOLIC BLOOD PRESSURE: 110 MMHG | RESPIRATION RATE: 18 BRPM | TEMPERATURE: 98.2 F | DIASTOLIC BLOOD PRESSURE: 68 MMHG

## 2020-03-18 PROCEDURE — 400013 HH SOC

## 2020-03-18 PROCEDURE — 3331090001 HH PPS REVENUE CREDIT

## 2020-03-18 PROCEDURE — G0299 HHS/HOSPICE OF RN EA 15 MIN: HCPCS

## 2020-03-18 PROCEDURE — G0151 HHCP-SERV OF PT,EA 15 MIN: HCPCS

## 2020-03-18 PROCEDURE — 3331090002 HH PPS REVENUE DEBIT

## 2020-03-19 PROCEDURE — 3331090002 HH PPS REVENUE DEBIT

## 2020-03-19 PROCEDURE — 3331090001 HH PPS REVENUE CREDIT

## 2020-03-20 ENCOUNTER — HOME CARE VISIT (OUTPATIENT)
Dept: SCHEDULING | Facility: HOME HEALTH | Age: 85
End: 2020-03-20
Payer: MEDICARE

## 2020-03-20 VITALS — HEART RATE: 104 BPM | DIASTOLIC BLOOD PRESSURE: 70 MMHG | TEMPERATURE: 97.9 F | SYSTOLIC BLOOD PRESSURE: 112 MMHG

## 2020-03-20 PROCEDURE — 3331090002 HH PPS REVENUE DEBIT

## 2020-03-20 PROCEDURE — G0151 HHCP-SERV OF PT,EA 15 MIN: HCPCS

## 2020-03-20 PROCEDURE — 3331090001 HH PPS REVENUE CREDIT

## 2020-03-21 PROCEDURE — 3331090002 HH PPS REVENUE DEBIT

## 2020-03-21 PROCEDURE — 3331090001 HH PPS REVENUE CREDIT

## 2020-03-22 PROCEDURE — 3331090001 HH PPS REVENUE CREDIT

## 2020-03-22 PROCEDURE — 3331090002 HH PPS REVENUE DEBIT

## 2020-03-23 ENCOUNTER — HOME CARE VISIT (OUTPATIENT)
Dept: SCHEDULING | Facility: HOME HEALTH | Age: 85
End: 2020-03-23
Payer: MEDICARE

## 2020-03-23 ENCOUNTER — HOME CARE VISIT (OUTPATIENT)
Dept: HOME HEALTH SERVICES | Facility: HOME HEALTH | Age: 85
End: 2020-03-23
Payer: MEDICARE

## 2020-03-23 VITALS
RESPIRATION RATE: 16 BRPM | SYSTOLIC BLOOD PRESSURE: 110 MMHG | HEART RATE: 94 BPM | TEMPERATURE: 97.8 F | DIASTOLIC BLOOD PRESSURE: 62 MMHG

## 2020-03-23 VITALS
HEART RATE: 98 BPM | TEMPERATURE: 97.6 F | OXYGEN SATURATION: 93 % | DIASTOLIC BLOOD PRESSURE: 60 MMHG | SYSTOLIC BLOOD PRESSURE: 122 MMHG

## 2020-03-23 PROCEDURE — G0299 HHS/HOSPICE OF RN EA 15 MIN: HCPCS

## 2020-03-23 PROCEDURE — 3331090002 HH PPS REVENUE DEBIT

## 2020-03-23 PROCEDURE — 3331090001 HH PPS REVENUE CREDIT

## 2020-03-23 PROCEDURE — G0152 HHCP-SERV OF OT,EA 15 MIN: HCPCS

## 2020-03-24 ENCOUNTER — HOME CARE VISIT (OUTPATIENT)
Dept: SCHEDULING | Facility: HOME HEALTH | Age: 85
End: 2020-03-24
Payer: MEDICARE

## 2020-03-24 VITALS
TEMPERATURE: 97.8 F | HEART RATE: 125 BPM | DIASTOLIC BLOOD PRESSURE: 90 MMHG | SYSTOLIC BLOOD PRESSURE: 120 MMHG | RESPIRATION RATE: 18 BRPM

## 2020-03-24 PROCEDURE — 3331090001 HH PPS REVENUE CREDIT

## 2020-03-24 PROCEDURE — G0157 HHC PT ASSISTANT EA 15: HCPCS

## 2020-03-24 PROCEDURE — 3331090002 HH PPS REVENUE DEBIT

## 2020-03-25 PROCEDURE — 3331090001 HH PPS REVENUE CREDIT

## 2020-03-25 PROCEDURE — 3331090002 HH PPS REVENUE DEBIT

## 2020-03-26 PROCEDURE — 3331090002 HH PPS REVENUE DEBIT

## 2020-03-26 PROCEDURE — 3331090001 HH PPS REVENUE CREDIT

## 2020-03-27 ENCOUNTER — HOME CARE VISIT (OUTPATIENT)
Dept: SCHEDULING | Facility: HOME HEALTH | Age: 85
End: 2020-03-27
Payer: MEDICARE

## 2020-03-27 VITALS
SYSTOLIC BLOOD PRESSURE: 110 MMHG | DIASTOLIC BLOOD PRESSURE: 76 MMHG | TEMPERATURE: 97.4 F | RESPIRATION RATE: 17 BRPM | HEART RATE: 88 BPM

## 2020-03-27 PROCEDURE — G0299 HHS/HOSPICE OF RN EA 15 MIN: HCPCS

## 2020-03-27 PROCEDURE — G0157 HHC PT ASSISTANT EA 15: HCPCS

## 2020-03-27 PROCEDURE — 3331090002 HH PPS REVENUE DEBIT

## 2020-03-27 PROCEDURE — 3331090001 HH PPS REVENUE CREDIT

## 2020-03-28 VITALS
TEMPERATURE: 98.7 F | SYSTOLIC BLOOD PRESSURE: 128 MMHG | RESPIRATION RATE: 18 BRPM | OXYGEN SATURATION: 95 % | DIASTOLIC BLOOD PRESSURE: 78 MMHG | HEART RATE: 92 BPM

## 2020-03-28 PROCEDURE — 3331090001 HH PPS REVENUE CREDIT

## 2020-03-28 PROCEDURE — 3331090002 HH PPS REVENUE DEBIT

## 2020-03-29 PROCEDURE — 3331090001 HH PPS REVENUE CREDIT

## 2020-03-29 PROCEDURE — 3331090002 HH PPS REVENUE DEBIT

## 2020-03-30 ENCOUNTER — HOME CARE VISIT (OUTPATIENT)
Dept: SCHEDULING | Facility: HOME HEALTH | Age: 85
End: 2020-03-30
Payer: MEDICARE

## 2020-03-30 VITALS
RESPIRATION RATE: 18 BRPM | OXYGEN SATURATION: 95 % | DIASTOLIC BLOOD PRESSURE: 64 MMHG | TEMPERATURE: 97.8 F | SYSTOLIC BLOOD PRESSURE: 116 MMHG | HEART RATE: 82 BPM

## 2020-03-30 PROCEDURE — 3331090002 HH PPS REVENUE DEBIT

## 2020-03-30 PROCEDURE — G0299 HHS/HOSPICE OF RN EA 15 MIN: HCPCS

## 2020-03-30 PROCEDURE — 3331090001 HH PPS REVENUE CREDIT

## 2020-03-31 ENCOUNTER — HOME CARE VISIT (OUTPATIENT)
Dept: SCHEDULING | Facility: HOME HEALTH | Age: 85
End: 2020-03-31
Payer: MEDICARE

## 2020-03-31 VITALS
SYSTOLIC BLOOD PRESSURE: 148 MMHG | DIASTOLIC BLOOD PRESSURE: 88 MMHG | HEART RATE: 112 BPM | TEMPERATURE: 97.3 F | RESPIRATION RATE: 17 BRPM

## 2020-03-31 PROCEDURE — 3331090002 HH PPS REVENUE DEBIT

## 2020-03-31 PROCEDURE — G0157 HHC PT ASSISTANT EA 15: HCPCS

## 2020-03-31 PROCEDURE — 3331090001 HH PPS REVENUE CREDIT

## 2020-04-01 PROCEDURE — 3331090001 HH PPS REVENUE CREDIT

## 2020-04-01 PROCEDURE — 3331090002 HH PPS REVENUE DEBIT

## 2020-04-02 ENCOUNTER — HOME CARE VISIT (OUTPATIENT)
Dept: SCHEDULING | Facility: HOME HEALTH | Age: 85
End: 2020-04-02
Payer: MEDICARE

## 2020-04-02 PROCEDURE — G0162 HHC RN E&M PLAN SVS, 15 MIN: HCPCS

## 2020-04-02 PROCEDURE — 3331090001 HH PPS REVENUE CREDIT

## 2020-04-02 PROCEDURE — 3331090002 HH PPS REVENUE DEBIT

## 2020-04-03 ENCOUNTER — HOME CARE VISIT (OUTPATIENT)
Dept: SCHEDULING | Facility: HOME HEALTH | Age: 85
End: 2020-04-03
Payer: MEDICARE

## 2020-04-03 VITALS
DIASTOLIC BLOOD PRESSURE: 82 MMHG | TEMPERATURE: 98.2 F | SYSTOLIC BLOOD PRESSURE: 116 MMHG | RESPIRATION RATE: 18 BRPM | HEART RATE: 84 BPM

## 2020-04-03 PROCEDURE — G0151 HHCP-SERV OF PT,EA 15 MIN: HCPCS

## 2020-04-03 PROCEDURE — 3331090002 HH PPS REVENUE DEBIT

## 2020-04-03 PROCEDURE — 3331090001 HH PPS REVENUE CREDIT

## 2020-04-04 VITALS
HEART RATE: 88 BPM | SYSTOLIC BLOOD PRESSURE: 128 MMHG | OXYGEN SATURATION: 94 % | TEMPERATURE: 97.8 F | RESPIRATION RATE: 18 BRPM | DIASTOLIC BLOOD PRESSURE: 78 MMHG

## 2020-04-04 PROCEDURE — 3331090002 HH PPS REVENUE DEBIT

## 2020-04-04 PROCEDURE — 3331090001 HH PPS REVENUE CREDIT

## 2020-04-05 PROCEDURE — 3331090001 HH PPS REVENUE CREDIT

## 2020-04-05 PROCEDURE — 3331090002 HH PPS REVENUE DEBIT

## 2020-04-06 PROCEDURE — 3331090002 HH PPS REVENUE DEBIT

## 2020-04-06 PROCEDURE — 3331090001 HH PPS REVENUE CREDIT

## 2020-04-07 ENCOUNTER — HOME CARE VISIT (OUTPATIENT)
Dept: SCHEDULING | Facility: HOME HEALTH | Age: 85
End: 2020-04-07
Payer: MEDICARE

## 2020-04-07 VITALS
SYSTOLIC BLOOD PRESSURE: 118 MMHG | TEMPERATURE: 97.8 F | HEART RATE: 84 BPM | RESPIRATION RATE: 18 BRPM | DIASTOLIC BLOOD PRESSURE: 70 MMHG

## 2020-04-07 PROCEDURE — 3331090001 HH PPS REVENUE CREDIT

## 2020-04-07 PROCEDURE — G0157 HHC PT ASSISTANT EA 15: HCPCS

## 2020-04-07 PROCEDURE — 3331090002 HH PPS REVENUE DEBIT

## 2020-04-08 PROCEDURE — 3331090001 HH PPS REVENUE CREDIT

## 2020-04-08 PROCEDURE — 3331090002 HH PPS REVENUE DEBIT

## 2020-04-09 PROCEDURE — 3331090002 HH PPS REVENUE DEBIT

## 2020-04-09 PROCEDURE — 3331090001 HH PPS REVENUE CREDIT

## 2020-04-10 ENCOUNTER — HOME CARE VISIT (OUTPATIENT)
Dept: SCHEDULING | Facility: HOME HEALTH | Age: 85
End: 2020-04-10
Payer: MEDICARE

## 2020-04-10 VITALS
DIASTOLIC BLOOD PRESSURE: 78 MMHG | RESPIRATION RATE: 18 BRPM | HEART RATE: 90 BPM | TEMPERATURE: 97.6 F | SYSTOLIC BLOOD PRESSURE: 108 MMHG

## 2020-04-10 PROCEDURE — G0151 HHCP-SERV OF PT,EA 15 MIN: HCPCS

## 2020-04-10 PROCEDURE — 3331090001 HH PPS REVENUE CREDIT

## 2020-04-10 PROCEDURE — 3331090002 HH PPS REVENUE DEBIT

## 2020-04-11 PROCEDURE — 3331090001 HH PPS REVENUE CREDIT

## 2020-04-11 PROCEDURE — 3331090002 HH PPS REVENUE DEBIT

## 2020-04-12 PROCEDURE — 3331090001 HH PPS REVENUE CREDIT

## 2020-04-12 PROCEDURE — 3331090002 HH PPS REVENUE DEBIT

## 2020-04-13 PROCEDURE — 3331090002 HH PPS REVENUE DEBIT

## 2020-04-13 PROCEDURE — 3331090001 HH PPS REVENUE CREDIT

## 2020-07-29 ENCOUNTER — HOSPITAL ENCOUNTER (EMERGENCY)
Age: 85
Discharge: HOME | End: 2020-07-29
Attending: EMERGENCY MEDICINE
Payer: MEDICARE

## 2020-07-29 ENCOUNTER — APPOINTMENT (OUTPATIENT)
Dept: GENERAL RADIOLOGY | Age: 85
End: 2020-07-29
Attending: EMERGENCY MEDICINE
Payer: MEDICARE

## 2020-07-29 VITALS
DIASTOLIC BLOOD PRESSURE: 76 MMHG | HEIGHT: 66 IN | TEMPERATURE: 99 F | BODY MASS INDEX: 25.71 KG/M2 | SYSTOLIC BLOOD PRESSURE: 165 MMHG | RESPIRATION RATE: 18 BRPM | HEART RATE: 85 BPM | WEIGHT: 160 LBS | OXYGEN SATURATION: 95 %

## 2020-07-29 DIAGNOSIS — R53.83 FATIGUE, UNSPECIFIED TYPE: Primary | ICD-10-CM

## 2020-07-29 LAB
ANION GAP SERPL CALC-SCNC: 9 MMOL/L (ref 7–16)
ATRIAL RATE: 394 BPM
BASOPHILS # BLD: 0.1 K/UL (ref 0–0.2)
BASOPHILS NFR BLD: 1 % (ref 0–2)
BUN SERPL-MCNC: 17 MG/DL (ref 8–23)
CALCIUM SERPL-MCNC: 9.9 MG/DL (ref 8.3–10.4)
CALCULATED R AXIS, ECG10: -68 DEGREES
CALCULATED T AXIS, ECG11: 96 DEGREES
CHLORIDE SERPL-SCNC: 105 MMOL/L (ref 98–107)
CO2 SERPL-SCNC: 28 MMOL/L (ref 21–32)
CREAT SERPL-MCNC: 1.46 MG/DL (ref 0.6–1)
DIAGNOSIS, 93000: NORMAL
DIFFERENTIAL METHOD BLD: ABNORMAL
EOSINOPHIL # BLD: 0.4 K/UL (ref 0–0.8)
EOSINOPHIL NFR BLD: 4 % (ref 0.5–7.8)
ERYTHROCYTE [DISTWIDTH] IN BLOOD BY AUTOMATED COUNT: 14.5 % (ref 11.9–14.6)
EST. AVERAGE GLUCOSE BLD GHB EST-MCNC: 171 MG/DL
GLUCOSE SERPL-MCNC: 194 MG/DL (ref 65–100)
HBA1C MFR BLD: 7.6 % (ref 4.8–6)
HCT VFR BLD AUTO: 48.5 % (ref 35.8–46.3)
HGB BLD-MCNC: 16.3 G/DL (ref 11.7–15.4)
IMM GRANULOCYTES # BLD AUTO: 0.1 K/UL (ref 0–0.5)
IMM GRANULOCYTES NFR BLD AUTO: 1 % (ref 0–5)
LYMPHOCYTES # BLD: 1.4 K/UL (ref 0.5–4.6)
LYMPHOCYTES NFR BLD: 13 % (ref 13–44)
MAGNESIUM SERPL-MCNC: 2.4 MG/DL (ref 1.8–2.4)
MCH RBC QN AUTO: 32.3 PG (ref 26.1–32.9)
MCHC RBC AUTO-ENTMCNC: 33.6 G/DL (ref 31.4–35)
MCV RBC AUTO: 96 FL (ref 79.6–97.8)
MONOCYTES # BLD: 0.9 K/UL (ref 0.1–1.3)
MONOCYTES NFR BLD: 8 % (ref 4–12)
NEUTS SEG # BLD: 8.1 K/UL (ref 1.7–8.2)
NEUTS SEG NFR BLD: 74 % (ref 43–78)
NRBC # BLD: 0 K/UL (ref 0–0.2)
PLATELET # BLD AUTO: 272 K/UL (ref 150–450)
PMV BLD AUTO: 10.9 FL (ref 9.4–12.3)
POTASSIUM SERPL-SCNC: 4.3 MMOL/L (ref 3.5–5.1)
Q-T INTERVAL, ECG07: 416 MS
QRS DURATION, ECG06: 158 MS
QTC CALCULATION (BEZET), ECG08: 479 MS
RBC # BLD AUTO: 5.05 M/UL (ref 4.05–5.2)
SODIUM SERPL-SCNC: 142 MMOL/L (ref 136–145)
TSH SERPL DL<=0.005 MIU/L-ACNC: 2.54 UIU/ML (ref 0.36–3.74)
VENTRICULAR RATE, ECG03: 80 BPM
WBC # BLD AUTO: 10.9 K/UL (ref 4.3–11.1)

## 2020-07-29 PROCEDURE — 83735 ASSAY OF MAGNESIUM: CPT

## 2020-07-29 PROCEDURE — 74011250637 HC RX REV CODE- 250/637: Performed by: EMERGENCY MEDICINE

## 2020-07-29 PROCEDURE — 85025 COMPLETE CBC W/AUTO DIFF WBC: CPT

## 2020-07-29 PROCEDURE — 83036 HEMOGLOBIN GLYCOSYLATED A1C: CPT

## 2020-07-29 PROCEDURE — 99285 EMERGENCY DEPT VISIT HI MDM: CPT

## 2020-07-29 PROCEDURE — 51701 INSERT BLADDER CATHETER: CPT

## 2020-07-29 PROCEDURE — 80048 BASIC METABOLIC PNL TOTAL CA: CPT

## 2020-07-29 PROCEDURE — 81003 URINALYSIS AUTO W/O SCOPE: CPT

## 2020-07-29 PROCEDURE — 93005 ELECTROCARDIOGRAM TRACING: CPT | Performed by: EMERGENCY MEDICINE

## 2020-07-29 PROCEDURE — 74011250636 HC RX REV CODE- 250/636: Performed by: EMERGENCY MEDICINE

## 2020-07-29 PROCEDURE — 84443 ASSAY THYROID STIM HORMONE: CPT

## 2020-07-29 PROCEDURE — 71046 X-RAY EXAM CHEST 2 VIEWS: CPT

## 2020-07-29 RX ORDER — METOPROLOL TARTRATE 25 MG/1
25 TABLET, FILM COATED ORAL
Status: COMPLETED | OUTPATIENT
Start: 2020-07-29 | End: 2020-07-29

## 2020-07-29 RX ADMIN — SODIUM CHLORIDE 500 ML: 900 INJECTION, SOLUTION INTRAVENOUS at 15:59

## 2020-07-29 RX ADMIN — METOPROLOL TARTRATE 25 MG: 25 TABLET, FILM COATED ORAL at 17:03

## 2020-07-29 NOTE — DISCHARGE INSTRUCTIONS
The exact cause of your fatigue is not known today. Your evaluation including labs and urinalysis and chest x-ray all are reassuring. There is no evidence of infection. You do appear to have some very slight dehydration for which she received some fluid. If your symptoms worsen or change in any way please return to the emergency department for further evaluation.

## 2020-07-29 NOTE — ED NOTES
Improved BP and HR after PO metoprolol, caregiver called to transport patient home.    Yaima Wise: [phone]

## 2020-07-29 NOTE — ED PROVIDER NOTES
[address] Jackie Will is a 80 y.o. female seen on 7/29/2020 at 1:09 PM in the [facility] EMERGENCY DEPT in room ER01/01. Chief Complaint   Patient presents with    Lethargy     HPI: 80-year-old female presenting to the emergency department complaining of fatigue. She states that she woke up this morning around 10 AM feeling very fatigued and tired overall. She denies pain, shortness of breath, fevers. She has no other complaints. She denies any history of similar symptoms in the past.  She denies pain with urination. She is brought to the emergency department by EMS who noted that her blood sugar was in the 230s with no history of diabetes in the past.  She denies increased urinary frequency or urgency. Historian: Patient    REVIEW OF SYSTEMS     Review of Systems   Constitutional: Positive for fatigue. Negative for fever. HENT: Negative. Eyes: Negative. Respiratory: Negative for cough, chest tightness, shortness of breath and wheezing. Cardiovascular: Negative for chest pain. Gastrointestinal: Negative for abdominal distention, abdominal pain, constipation, diarrhea and vomiting. Endocrine: Negative. Genitourinary: Negative for dysuria, flank pain, frequency and urgency. Neurological: Negative for dizziness, syncope and headaches. Psychiatric/Behavioral: Negative. All other systems reviewed and are negative.       PAST MEDICAL HISTORY     Past Medical History:   Diagnosis Date    Acute kidney injury ([facility] [address] 12/29/2013    Atrial fibrillation (HCC)     pacemaker    Bell's palsy     Benign hypertensive heart disease without heart failure     Bowel obstruction (HCC) 8/20/2011    Congestive heart failure, unspecified     Diverticulosis     Flat foot(734)     Hemorrhage of rectum and anus     Hiatal hernia     Hyperlipidemia     Hypertension     Hypertonicity of bladder     Hypothyroidism     Loss of height     Menopause     Osteoporosis     Reflux esophagitis     Restless legs syndrome (RLS)     Slow transit constipation     Stricture and stenosis of esophagus     Unspecified gastritis and gastroduodenitis without mention of hemorrhage     Unspecified hemorrhoids with other complication     Unspecified hereditary and idiopathic peripheral neuropathy     Urge incontinence      Past Surgical History:   Procedure Laterality Date    BREAST SURGERY PROCEDURE UNLISTED      cyst    HX APPENDECTOMY      HX CHOLECYSTECTOMY      HX HEENT      HX HYSTERECTOMY      HX PACEMAKER      HX TONSILLECTOMY      NEUROLOGICAL PROCEDURE UNLISTED      carpal tunnel both hands     Social History     Socioeconomic History    Marital status:      Spouse name: Not on file    Number of children: Not on file    Years of education: Not on file    Highest education level: Not on file   Tobacco Use    Smoking status: Never Smoker    Smokeless tobacco: Never Used   Substance and Sexual Activity    Alcohol use: No    Drug use: No    Sexual activity: Not Currently     Prior to Admission Medications   Prescriptions Last Dose Informant Patient Reported? Taking? STOOL SOFTENER 100 mg capsule   No No   Sig: TAKE ONE CAPSULE BY MOUTH TWICE A DAY   acetaminophen (TYLENOL EXTRA STRENGTH) 500 mg tablet   Yes No   Sig: Take 500 mg by mouth every six (6) hours as needed for Pain. calcium carbonate (CALTREX) 600 mg calcium (1,500 mg) tablet   Yes No   Sig: Take 1 Tab by mouth daily. denosumab (PROLIA) 60 mg/mL injection   Yes No   Si mg by SubCUTAneous route every month. digoxin (LANOXIN) 0.125 mg tablet   Yes No   Sig: Take  by mouth daily. furosemide (LASIX) 20 mg tablet   Yes No   Sig: Take 20 mg by mouth daily.    levothyroxine (SYNTHROID) 50 mcg tablet   No No   Sig: TAKE 1 TABLET BY MOUTH DAILY   lovastatin (MEVACOR) 20 mg tablet   No No   Sig: TAKE 1 TABLET BY MOUTH EVERY DAY IN THE EVENING   metoprolol tartrate (LOPRESSOR) 25 mg tablet   Yes No   Sig: Take 1.5 Tabs by mouth two (2) times a day. polyethylene glycol (MIRALAX) 17 gram/dose powder   Yes No   Sig: Take 17 g by mouth nightly. rivaroxaban (XARELTO) 15 mg tab tablet   No No   Sig: Take 1 Tab by mouth daily. Facility-Administered Medications: None     Allergies   Allergen Reactions    Erythromycin Other (comments)     Upsets entire system    Sulfa (Sulfonamide Antibiotics) Other (comments)     Kidneys to crystalize        PHYSICAL EXAM       There were no vitals filed for this visit. Vital signs were reviewed. Physical Exam  Vitals signs reviewed. Constitutional:       General: She is not in acute distress. Appearance: She is well-developed. She is not diaphoretic. HENT:      Head: Normocephalic and atraumatic. Eyes:      Pupils: Pupils are equal, round, and reactive to light. Neck:      Musculoskeletal: Normal range of motion and neck supple. Cardiovascular:      Rate and Rhythm: Normal rate and regular rhythm. Heart sounds: Normal heart sounds. No murmur. No friction rub. No gallop. Pulmonary:      Effort: Pulmonary effort is normal. No respiratory distress. Breath sounds: Normal breath sounds. No stridor. No wheezing. Abdominal:      General: Bowel sounds are normal. There is no distension. Palpations: Abdomen is soft. There is no mass. Tenderness: There is no abdominal tenderness. There is no guarding or rebound. Musculoskeletal: Normal range of motion. General: No tenderness or deformity. Skin:     General: Skin is warm and dry. Findings: No erythema or rash. Neurological:      Mental Status: She is alert and oriented to person, place, and time. GCS: GCS eye subscore is 4. GCS verbal subscore is 5. GCS motor subscore is 6. Cranial Nerves: Cranial nerves are intact. No cranial nerve deficit, dysarthria or facial asymmetry. Sensory: Sensation is intact. No sensory deficit.       Motor: Motor function is intact. No weakness. Coordination: Coordination is intact. Comments: No focal neuro deficits   Psychiatric:         Behavior: Behavior normal.          MEDICAL DECISION MAKING     MDM  Procedures    ED Course:    3:41 PM  Called and spoke with the patient's legal guardian, her daughter, who states that they were out of town and spoke to the patient on the phone today this morning she stated that she was feeling fatigued and her sitter had been concerned that she had had an episode of shallow breathing. As a result that asked EMS to come out. In the ER, the patient is well-appearing, she is saturating 94 to 95%, without any signs of increased work of breathing. Her chest x-ray is clear, urine shows no evidence of infection. She does appear to have some very mild dehydration on blood work. Also on exam she is got some dry mucous membranes and some decreased skin turgor. She received 500 mL of fluid. Given the ongoing pandemic I think she would certainly be best served at home, I see no reason for admission at this time. However I have cautioned them to have a low threshold to return if her symptoms worsen or change in any way. 3:45 PM  I did call and speak to the patient's primary care doctor. I notified them of her hyperglycemia, they will follow-up with an A1c that I have ordered and to determine if she needs any oral hypoglycemics. In addition they will follow-up with a TSH that I have added as well.    4:56 PM  Prior to dc, pt noted to be hypertensive, asymptomatic. Will provide her home med metop 25mg and dc home. Disposition: Discharge home   Diagnosis: Fatigue  ____________________________________________________________________  A portion of this note was generated using voice recognition dictation software.  While the note has been reviewed for accuracy, please note certain words and phrases may not be transcribed as intended and some grammatical and/or typographical errors may be present.

## 2020-07-29 NOTE — ROUTINE PROCESS
I have reviewed discharge instructions with the patient and caregiver (daphnie). The patient verbalized understanding. Patient left ED via Discharge Method: wheelchair to Home with (caregiver. Family at home waiting for patient). Opportunity for questions and clarification provided. Patient given 0 scripts. To continue your aftercare when you leave the hospital, you may receive an automated call from our care team to check in on how you are doing. This is a free service and part of our promise to provide the best care and service to meet your aftercare needs.  If you have questions, or wish to unsubscribe from this service please call [phone]. Thank you for Choosing our [facility] Emergency Department. Patient well appearing at discharge. Ambulatory too and from wheelchair w/ minimal assistance. Assisted into car w/ minimal assistance. Family to help patient when she gets home. Instructed to follow-up w/ pcp

## 2020-07-29 NOTE — ED TRIAGE NOTES
Patient arrived via EMS from private home. EMS was called for generalized weakness and malaise beginning this AM 0700. /30, HR 90bpm, temp 98.7F oral, o2 sat 96% on RA. A+O x4.

## 2020-07-30 ENCOUNTER — PATIENT OUTREACH (OUTPATIENT)
Dept: CASE MANAGEMENT | Age: 85
End: 2020-07-30

## 2020-07-30 NOTE — PROGRESS NOTES
Patient contacted regarding recent discharge and COVID-19 risk. Ambulatory Care Manager contacted the family by telephone to perform post discharge assessment. Verified name and  with family as identifiers. Patient has following risk factors of: HTN. ACM reviewed discharge instructions, medical action plan and red flags related to discharge diagnosis. Reviewed and educated them on any new and changed medications related to discharge diagnosis. Advised obtaining a 90-day supply of all daily and as-needed medications. Advance Care Planning:   Does patient have an Advance Directive: not on file     Education provided regarding infection prevention, and signs and symptoms of COVID-19 and when to seek medical attention with family who verbalized understanding. Discussed exposure protocols and quarantine from [address] you at higher risk for severe illness  and given an opportunity for questions and concerns. The family agrees to contact the COVID-19 hotline [phone] or PCP office for questions related to their healthcare. CTN/ACM provided contact information for future reference. From CDC: Are you at higher risk for severe illness?  Wash your hands often.  Avoid close contact (6 feet, which is about two arm lengths) with people who are sick.  Put distance between yourself and other people if COVID-19 is spreading in your community.  Clean and disinfect frequently touched surfaces.  Avoid all cruise travel and non-essential air travel.  Call your healthcare professional if you have concerns about COVID-19 and your underlying condition or if you are sick. For more information on steps you can take to protect yourself, see CDC's How to Protect Yourself      Patient/family/caregiver given information for [facility] and agrees to enroll no    Plan for follow-up call in 7-14 days based on severity of symptoms and risk factors.

## 2020-08-14 ENCOUNTER — PATIENT OUTREACH (OUTPATIENT)
Dept: CASE MANAGEMENT | Age: 85
End: 2020-08-14

## 2020-08-14 NOTE — PROGRESS NOTES
Patient resolved from Transition of Care episode on 8/14/20. Patient/family has been provided the following resources and education related to COVID-19:                         Signs, symptoms and red flags related to COVID-19            CDC exposure and quarantine guidelines            [facility] exposure contact - [phone]            Contact for their local Department of Health                 Patient currently reports that the following symptoms have improved:  no new symptoms and no worsening symptoms. No further outreach scheduled with this CTN/ACM/LPN/HC/ MA. Episode of Care resolved. Patient has this CTN/ACM/LPN/HC/MA contact information if future needs arise.

## 2022-01-07 ENCOUNTER — APPOINTMENT (OUTPATIENT)
Dept: CT IMAGING | Age: 87
DRG: 178 | End: 2022-01-07
Attending: EMERGENCY MEDICINE
Payer: MEDICARE

## 2022-01-07 ENCOUNTER — APPOINTMENT (OUTPATIENT)
Dept: GENERAL RADIOLOGY | Age: 87
DRG: 178 | End: 2022-01-07
Attending: EMERGENCY MEDICINE
Payer: MEDICARE

## 2022-01-07 ENCOUNTER — HOSPITAL ENCOUNTER (INPATIENT)
Age: 87
LOS: 4 days | Discharge: NURSING, REHAB OR LONG-TERM CARE | DRG: 178 | End: 2022-01-11
Attending: EMERGENCY MEDICINE | Admitting: STUDENT IN AN ORGANIZED HEALTH CARE EDUCATION/TRAINING PROGRAM
Payer: MEDICARE

## 2022-01-07 DIAGNOSIS — R91.8 BILATERAL PULMONARY INFILTRATES ON CHEST X-RAY: ICD-10-CM

## 2022-01-07 DIAGNOSIS — R09.02 HYPOXIA: Primary | ICD-10-CM

## 2022-01-07 PROBLEM — E11.9 DM2 (DIABETES MELLITUS, TYPE 2) (HCC): Status: ACTIVE | Noted: 2022-01-07

## 2022-01-07 PROBLEM — R53.1 WEAKNESS GENERALIZED: Status: ACTIVE | Noted: 2022-01-07

## 2022-01-07 PROBLEM — S31.000A SACRAL WOUND: Status: ACTIVE | Noted: 2022-01-07

## 2022-01-07 PROBLEM — J18.9 CAP (COMMUNITY ACQUIRED PNEUMONIA): Status: ACTIVE | Noted: 2022-01-07

## 2022-01-07 LAB
ALBUMIN SERPL-MCNC: 2.5 G/DL (ref 3.2–4.6)
ALBUMIN/GLOB SERPL: 0.6 {RATIO} (ref 1.2–3.5)
ALP SERPL-CCNC: 117 U/L (ref 50–136)
ALT SERPL-CCNC: 30 U/L (ref 12–65)
ANION GAP SERPL CALC-SCNC: 8 MMOL/L (ref 7–16)
APPEARANCE UR: CLEAR
AST SERPL-CCNC: 31 U/L (ref 15–37)
ATRIAL RATE: 146 BPM
BACTERIA URNS QL MICRO: 0 /HPF
BASOPHILS # BLD: 0.1 K/UL (ref 0–0.2)
BASOPHILS NFR BLD: 0 % (ref 0–2)
BILIRUB SERPL-MCNC: 0.8 MG/DL (ref 0.2–1.1)
BILIRUB UR QL: NEGATIVE
BNP SERPL-MCNC: 4161 PG/ML
BUN SERPL-MCNC: 19 MG/DL (ref 8–23)
CALCIUM SERPL-MCNC: 9.2 MG/DL (ref 8.3–10.4)
CALCULATED R AXIS, ECG10: 63 DEGREES
CALCULATED T AXIS, ECG11: -38 DEGREES
CHLORIDE SERPL-SCNC: 100 MMOL/L (ref 98–107)
CO2 SERPL-SCNC: 26 MMOL/L (ref 21–32)
COLOR UR: YELLOW
COVID-19 RAPID TEST, COVR: NOT DETECTED
CREAT SERPL-MCNC: 1.07 MG/DL (ref 0.6–1)
DIAGNOSIS, 93000: NORMAL
DIFFERENTIAL METHOD BLD: ABNORMAL
DIGOXIN SERPL-MCNC: 0.7 NG/ML (ref 0.9–2.1)
EOSINOPHIL # BLD: 0 K/UL (ref 0–0.8)
EOSINOPHIL NFR BLD: 0 % (ref 0.5–7.8)
ERYTHROCYTE [DISTWIDTH] IN BLOOD BY AUTOMATED COUNT: 14.7 % (ref 11.9–14.6)
GLOBULIN SER CALC-MCNC: 4.5 G/DL (ref 2.3–3.5)
GLUCOSE SERPL-MCNC: 180 MG/DL (ref 65–100)
GLUCOSE UR STRIP.AUTO-MCNC: NEGATIVE MG/DL
HCT VFR BLD AUTO: 41.5 % (ref 35.8–46.3)
HGB BLD-MCNC: 13.4 G/DL (ref 11.7–15.4)
HGB UR QL STRIP: ABNORMAL
IMM GRANULOCYTES # BLD AUTO: 0.1 K/UL (ref 0–0.5)
IMM GRANULOCYTES NFR BLD AUTO: 1 % (ref 0–5)
KETONES UR QL STRIP.AUTO: NEGATIVE MG/DL
LACTATE SERPL-SCNC: 2 MMOL/L (ref 0.4–2)
LACTATE SERPL-SCNC: 2.1 MMOL/L (ref 0.4–2)
LEUKOCYTE ESTERASE UR QL STRIP.AUTO: NEGATIVE
LYMPHOCYTES # BLD: 1 K/UL (ref 0.5–4.6)
LYMPHOCYTES NFR BLD: 7 % (ref 13–44)
MCH RBC QN AUTO: 29.9 PG (ref 26.1–32.9)
MCHC RBC AUTO-ENTMCNC: 32.3 G/DL (ref 31.4–35)
MCV RBC AUTO: 92.6 FL (ref 79.6–97.8)
MONOCYTES # BLD: 0.9 K/UL (ref 0.1–1.3)
MONOCYTES NFR BLD: 7 % (ref 4–12)
NEUTS SEG # BLD: 11.4 K/UL (ref 1.7–8.2)
NEUTS SEG NFR BLD: 85 % (ref 43–78)
NITRITE UR QL STRIP.AUTO: NEGATIVE
NRBC # BLD: 0 K/UL (ref 0–0.2)
OTHER OBSERVATIONS,UCOM: ABNORMAL
PH UR STRIP: 5.5 [PH] (ref 5–9)
PLATELET # BLD AUTO: 467 K/UL (ref 150–450)
PMV BLD AUTO: 9.5 FL (ref 9.4–12.3)
POTASSIUM SERPL-SCNC: 4 MMOL/L (ref 3.5–5.1)
PROCALCITONIN SERPL-MCNC: <0.05 NG/ML (ref 0–0.49)
PROT SERPL-MCNC: 7 G/DL (ref 6.3–8.2)
PROT UR STRIP-MCNC: NEGATIVE MG/DL
Q-T INTERVAL, ECG07: 335 MS
QRS DURATION, ECG06: 103 MS
QTC CALCULATION (BEZET), ECG08: 478 MS
RBC # BLD AUTO: 4.48 M/UL (ref 4.05–5.2)
SODIUM SERPL-SCNC: 134 MMOL/L (ref 136–145)
SOURCE, COVRS: NORMAL
SP GR UR REFRACTOMETRY: 1.01 (ref 1–1.02)
UROBILINOGEN UR QL STRIP.AUTO: 0.2 EU/DL (ref 0.2–1)
VENTRICULAR RATE, ECG03: 122 BPM
WBC # BLD AUTO: 13.4 K/UL (ref 4.3–11.1)

## 2022-01-07 PROCEDURE — 74011250637 HC RX REV CODE- 250/637: Performed by: STUDENT IN AN ORGANIZED HEALTH CARE EDUCATION/TRAINING PROGRAM

## 2022-01-07 PROCEDURE — 87040 BLOOD CULTURE FOR BACTERIA: CPT

## 2022-01-07 PROCEDURE — 83880 ASSAY OF NATRIURETIC PEPTIDE: CPT

## 2022-01-07 PROCEDURE — 96375 TX/PRO/DX INJ NEW DRUG ADDON: CPT

## 2022-01-07 PROCEDURE — 80053 COMPREHEN METABOLIC PANEL: CPT

## 2022-01-07 PROCEDURE — 74011000250 HC RX REV CODE- 250: Performed by: STUDENT IN AN ORGANIZED HEALTH CARE EDUCATION/TRAINING PROGRAM

## 2022-01-07 PROCEDURE — 80162 ASSAY OF DIGOXIN TOTAL: CPT

## 2022-01-07 PROCEDURE — 72125 CT NECK SPINE W/O DYE: CPT

## 2022-01-07 PROCEDURE — 74011000250 HC RX REV CODE- 250: Performed by: EMERGENCY MEDICINE

## 2022-01-07 PROCEDURE — 81001 URINALYSIS AUTO W/SCOPE: CPT

## 2022-01-07 PROCEDURE — 74011000258 HC RX REV CODE- 258: Performed by: EMERGENCY MEDICINE

## 2022-01-07 PROCEDURE — 96365 THER/PROPH/DIAG IV INF INIT: CPT

## 2022-01-07 PROCEDURE — 65270000029 HC RM PRIVATE

## 2022-01-07 PROCEDURE — 84145 PROCALCITONIN (PCT): CPT

## 2022-01-07 PROCEDURE — 71045 X-RAY EXAM CHEST 1 VIEW: CPT

## 2022-01-07 PROCEDURE — 83605 ASSAY OF LACTIC ACID: CPT

## 2022-01-07 PROCEDURE — 87635 SARS-COV-2 COVID-19 AMP PRB: CPT

## 2022-01-07 PROCEDURE — 85025 COMPLETE CBC W/AUTO DIFF WBC: CPT

## 2022-01-07 PROCEDURE — 70450 CT HEAD/BRAIN W/O DYE: CPT

## 2022-01-07 PROCEDURE — 99285 EMERGENCY DEPT VISIT HI MDM: CPT

## 2022-01-07 PROCEDURE — 93005 ELECTROCARDIOGRAM TRACING: CPT | Performed by: EMERGENCY MEDICINE

## 2022-01-07 PROCEDURE — 74011250636 HC RX REV CODE- 250/636: Performed by: EMERGENCY MEDICINE

## 2022-01-07 RX ORDER — LEVOTHYROXINE SODIUM 50 UG/1
50 TABLET ORAL
Status: DISCONTINUED | OUTPATIENT
Start: 2022-01-08 | End: 2022-01-11 | Stop reason: HOSPADM

## 2022-01-07 RX ORDER — FUROSEMIDE 10 MG/ML
40 INJECTION INTRAMUSCULAR; INTRAVENOUS
Status: COMPLETED | OUTPATIENT
Start: 2022-01-07 | End: 2022-01-07

## 2022-01-07 RX ORDER — PANTOPRAZOLE SODIUM 40 MG/1
40 TABLET, DELAYED RELEASE ORAL
Status: DISCONTINUED | OUTPATIENT
Start: 2022-01-08 | End: 2022-01-11 | Stop reason: HOSPADM

## 2022-01-07 RX ORDER — ATORVASTATIN CALCIUM 10 MG/1
10 TABLET, FILM COATED ORAL DAILY
Status: DISCONTINUED | OUTPATIENT
Start: 2022-01-08 | End: 2022-01-11 | Stop reason: HOSPADM

## 2022-01-07 RX ORDER — CALCIUM CARBONATE 200(500)MG
600 TABLET,CHEWABLE ORAL
Status: DISCONTINUED | OUTPATIENT
Start: 2022-01-08 | End: 2022-01-11 | Stop reason: HOSPADM

## 2022-01-07 RX ORDER — DILTIAZEM HYDROCHLORIDE 5 MG/ML
10 INJECTION INTRAVENOUS ONCE
Status: COMPLETED | OUTPATIENT
Start: 2022-01-07 | End: 2022-01-07

## 2022-01-07 RX ORDER — ACETAMINOPHEN 325 MG/1
650 TABLET ORAL
Status: DISCONTINUED | OUTPATIENT
Start: 2022-01-07 | End: 2022-01-11 | Stop reason: HOSPADM

## 2022-01-07 RX ORDER — ACETAMINOPHEN 650 MG/1
650 SUPPOSITORY RECTAL
Status: DISCONTINUED | OUTPATIENT
Start: 2022-01-07 | End: 2022-01-11 | Stop reason: HOSPADM

## 2022-01-07 RX ORDER — DIGOXIN 125 MCG
0.06 TABLET ORAL DAILY
Status: DISCONTINUED | OUTPATIENT
Start: 2022-01-08 | End: 2022-01-11 | Stop reason: HOSPADM

## 2022-01-07 RX ORDER — ONDANSETRON 2 MG/ML
4 INJECTION INTRAMUSCULAR; INTRAVENOUS
Status: DISCONTINUED | OUTPATIENT
Start: 2022-01-07 | End: 2022-01-11 | Stop reason: HOSPADM

## 2022-01-07 RX ORDER — SODIUM CHLORIDE 0.9 % (FLUSH) 0.9 %
5-40 SYRINGE (ML) INJECTION AS NEEDED
Status: DISCONTINUED | OUTPATIENT
Start: 2022-01-07 | End: 2022-01-11 | Stop reason: HOSPADM

## 2022-01-07 RX ORDER — ONDANSETRON 4 MG/1
4 TABLET, ORALLY DISINTEGRATING ORAL
Status: DISCONTINUED | OUTPATIENT
Start: 2022-01-07 | End: 2022-01-11 | Stop reason: HOSPADM

## 2022-01-07 RX ORDER — POLYETHYLENE GLYCOL 3350 17 G/17G
17 POWDER, FOR SOLUTION ORAL DAILY PRN
Status: DISCONTINUED | OUTPATIENT
Start: 2022-01-07 | End: 2022-01-11 | Stop reason: HOSPADM

## 2022-01-07 RX ORDER — FUROSEMIDE 40 MG/1
40 TABLET ORAL DAILY
Status: DISCONTINUED | OUTPATIENT
Start: 2022-01-08 | End: 2022-01-09

## 2022-01-07 RX ORDER — FUROSEMIDE 10 MG/ML
40 INJECTION INTRAMUSCULAR; INTRAVENOUS DAILY
Status: DISCONTINUED | OUTPATIENT
Start: 2022-01-08 | End: 2022-01-09 | Stop reason: SDUPTHER

## 2022-01-07 RX ORDER — SODIUM CHLORIDE 0.9 % (FLUSH) 0.9 %
5-40 SYRINGE (ML) INJECTION EVERY 8 HOURS
Status: DISCONTINUED | OUTPATIENT
Start: 2022-01-07 | End: 2022-01-11 | Stop reason: HOSPADM

## 2022-01-07 RX ADMIN — FUROSEMIDE 40 MG: 10 INJECTION, SOLUTION INTRAMUSCULAR; INTRAVENOUS at 13:51

## 2022-01-07 RX ADMIN — METOPROLOL TARTRATE 75 MG: 50 TABLET, FILM COATED ORAL at 18:06

## 2022-01-07 RX ADMIN — CEFEPIME HYDROCHLORIDE 2 G: 2 INJECTION, POWDER, FOR SOLUTION INTRAVENOUS at 13:59

## 2022-01-07 RX ADMIN — SODIUM CHLORIDE, PRESERVATIVE FREE 5 ML: 5 INJECTION INTRAVENOUS at 22:16

## 2022-01-07 RX ADMIN — SODIUM CHLORIDE, PRESERVATIVE FREE 10 ML: 5 INJECTION INTRAVENOUS at 17:18

## 2022-01-07 RX ADMIN — SODIUM CHLORIDE 1000 ML: 900 INJECTION, SOLUTION INTRAVENOUS at 12:45

## 2022-01-07 RX ADMIN — DILTIAZEM HYDROCHLORIDE 10 MG: 5 INJECTION INTRAVENOUS at 13:54

## 2022-01-07 NOTE — ED NOTES
Report received from Alec Hatch, [address] [place] [address]. This RN assumes pt care at this time.

## 2022-01-07 NOTE — H&P
Hospitalist History and Physical   Admit Date:  2022 10:48 AM   Name:  Bishop Wasserman   Age:  80 y.o. Sex:  female  :  3/11/1923   MRN:  [id number]   Room:  [place] [address]    Presenting Complaint: Lethargy    Reason(s) for Admission: CAP (community acquired pneumonia) [J18.9]     History of Present Illness:   Bishop Wasserman is a 80 y.o. female with medical history of atrial fibrillation on Xarelto, hypothyroidism, CHF, hypertension, pacemaker who presented with confusion, generalized weakness. Patient was found by her family at the side of her bed this morning. Patient is taken care of by a living aide. Over the past week patient has had increased weakness and fatigue. There was initial concern of urinary tract infection since she had increased urinary frequency. They had gone to their primary care provider that had a urinalysis but was unsure of the results. Patient also complained of increased shortness of breath and nonproductive cough. CT head/spine negative for any acute abnormalities. Patient also presented with A. fib with RVR and had been given one-time 10 mg IV dose of Cardizem. Patient had just seen her doctor and they advised her to take half of her digoxin. Chest x-ray showed increasing bilateral infiltrates and patient currently on 3 L satting at 93%. CT showed bilateral pulmonary infiltrates and effusion. BNP of 4161 but does not look fluid overloaded. WBC of 13.4 and lactic acid of 2.1. Family also had concern for sacral wound that they just noticed yesterday. Patient uses walker and cane to get around. Review of Systems:  10 systems reviewed and negative except as noted in HPI.   Assessment & Plan:   CAP (community acquired pneumonia) (2022)  Chest x-ray with bilateral infiltrates  Patient presented with hypoxia now currently on 3 L at 93%  Continue supplemental oxygen to maintain oxygen saturation greater than 90%  Continue cefepime  UA negative    Congestive heart failure  BNP elevated greater than 4000  Patient given one-time dose Lasix in ED  Continue Lasix daily IV    Atrial fibrillation with RVR  Patient currently on digoxin but takes half of the 12.5 mg dose since levels were elevated last time she saw her PCP  Has been given one-time 10 mg IV dose of Cardizem  Cardiology consulted  Patient has been compliant on Xarelto   Started on Cardizem drip    Hypothyroidism  Continue Synthroid    Hypertension  Continue home blood pressure medications    Type 2 diabetes  A1c of 7.9  Sliding scale insulin    Generalized weakness  PT/OT  Case management consulted for discharge planning    Sacral wound   Wound care consulted        Dispo/Discharge Planning:   Dispo pending clinical course    Diet: No diet orders on file  VTE ppx: Xarelto  Code status: Prior    Hospital Problems as of 1/7/2022 Date Reviewed: 11/29/2021          Codes Class Noted - Resolved POA    CAP (community acquired pneumonia) ICD-10-CM: J18.9  ICD-9-CM: 486  1/7/2022 - Present Unknown              Past History:  Past Medical History:   Diagnosis Date    Acute kidney injury ([facility] 12/29/2013    Atrial fibrillation ([facility]     pacemaker    Bell's palsy     Benign hypertensive heart disease without heart failure     Bowel obstruction ([facility] 8/20/2011    Congestive heart failure, unspecified     Diverticulosis     Flat foot(734)     Hemorrhage of rectum and anus     Hiatal hernia     Hyperlipidemia     Hypertension     Hypertonicity of bladder     Hypothyroidism     Loss of height     Menopause     Osteoporosis     Reflux esophagitis     Restless legs syndrome (RLS)     Slow transit constipation     Stricture and stenosis of esophagus     Unspecified gastritis and gastroduodenitis without mention of hemorrhage     Unspecified hemorrhoids with other complication     Unspecified hereditary and idiopathic peripheral neuropathy     Urge incontinence      Past Surgical History: Procedure Laterality Date    HX APPENDECTOMY      HX CHOLECYSTECTOMY      HX HEENT      HX HYSTERECTOMY      HX PACEMAKER      HX TONSILLECTOMY      NEUROLOGICAL PROCEDURE UNLISTED      carpal tunnel both hands    CA BREAST SURGERY PROCEDURE UNLISTED      cyst      Allergies   Allergen Reactions    Erythromycin Other (comments)     Upsets entire system    Sulfa (Sulfonamide Antibiotics) Other (comments)     Kidneys to crystalize      Social History     Tobacco Use    Smoking status: Never Smoker    Smokeless tobacco: Never Used   Substance Use Topics    Alcohol use: No      Family History   Problem Relation Age of Onset    Cancer Mother         Pancreatic    Heart Disease Mother     Heart Disease Father     Stroke Other     Diabetes Other     Cancer Other     Depression Child       Family history reviewed and negative except as noted above.     Immunization History   Administered Date(s) Administered    COVID-19, Moderna, Primary or Immunocompromised Series, MRNA, PF, 100mcg/0.5mL 01/13/2021, 02/10/2021, 10/26/2021    Influenza High Dose Vaccine PF 12/21/2015, 10/05/2016, 10/05/2017, 10/11/2018, 10/12/2021    Influenza Vaccine (Tri) Adjuvanted (>65 Yrs FLUAD TRI 64429) 09/06/2019    Influenza, Quadrivalent, Adjuvanted (>65 Yrs FLUAD QUAD 62976) 09/15/2020    Pneumococcal Conjugate (PCV-13) 08/11/2015    Pneumococcal Polysaccharide (PPSV-23) 09/25/2007    TB Skin Test (PPD) Intradermal 12/30/2013, 05/11/2015    Td 03/21/2004    Tdap 10/11/2016     Prior to Admit Medications:  Current Outpatient Medications   Medication Instructions    acetaminophen (TYLENOL EXTRA STRENGTH) 500 mg, Oral, EVERY 6 HOURS AS NEEDED    albuterol (PROVENTIL VENTOLIN) 1.25 mg, Nebulization, 2 TIMES DAILY    azelastine (ASTELIN) 137 mcg (0.1 %) nasal spray 1 Spray, Both Nostrils, 2 TIMES DAILY, Use in each nostril as directed    calcium carbonate (CALTREX) 600 mg, Oral, DAILY    denosumab (PROLIA) 60 mg, SubCUTAneous    digoxin (LANOXIN) 0.125 mg tablet No dose, route, or frequency recorded.  fexofenadine (ALLEGRA) 180 mg, Oral, DAILY    furosemide (LASIX) 40 mg, Oral, DAILY    levothyroxine (SYNTHROID) 50 mcg tablet TAKE 1 TABLET BY MOUTH DAILY    lovastatin (MEVACOR) 20 mg tablet TAKE 1 TABLET BY MOUTH EVERY DAY IN THE EVENING    metoprolol tartrate (LOPRESSOR) 75 mg, Oral, 2 TIMES DAILY    omeprazole (PRILOSEC) 40 mg capsule TAKE ONE CAPSULE BY MOUTH EVERY DAY    polyethylene glycol (MIRALAX) 17 g, Oral, EVERY BEDTIME    rivaroxaban (XARELTO) 15 mg, Oral, DAILY    STOOL SOFTENER 100 mg capsule TAKE ONE CAPSULE BY MOUTH TWICE A DAY       Objective:     Patient Vitals for the past 24 hrs:   Temp Pulse Resp BP SpO2   01/07/22 1525  (!) 111 21 135/65 93 %   01/07/22 1503  (!) 108 24 136/86 95 %   01/07/22 1433  (!) 109 20 131/72 94 %   01/07/22 1404  97 19 (!) 105/59 93 %   01/07/22 1354  (!) 124  (!) 154/85    01/07/22 1351  (!) 114  (!) 154/85    01/07/22 1349  (!) 122  (!) 154/85 94 %   01/07/22 1304  (!) 125  (!) 157/88 95 %   01/07/22 1234  (!) 134 18 (!) 152/82 96 %   01/07/22 1204  (!) 115 19 120/85 97 %   01/07/22 1133     97 %   01/07/22 1132     90 %   01/07/22 1131     97 %   01/07/22 1125     90 %   01/07/22 1117    137/80 91 %   01/07/22 1104 98.3 °F (36.8 °C) (!) 120 16 (!) 152/87 91 %     Oxygen Therapy  O2 Sat (%): 93 % (01/07/22 1525)  Pulse via Oximetry: 124 beats per minute (01/07/22 1525)  O2 Device: Nasal cannula (01/07/22 1133)  O2 Flow Rate (L/min): 3 l/min (01/07/22 1133)    Estimated body mass index is 26.39 kg/m² as calculated from the following:    Height as of this encounter: 5' 6\" (1.676 m). Weight as of this encounter: 74.2 kg (163 lb 8 oz).     Intake/Output Summary (Last 24 hours) at 1/7/2022 1624  Last data filed at 1/7/2022 1429  Gross per 24 hour   Intake 1100 ml   Output    Net 1100 ml         Physical Exam:  Blood pressure 135/65, pulse (!) 111, temperature 98.3 °F (36.8 °C), resp. rate 21, height 5' 6\" (1.676 m), weight 74.2 kg (163 lb 8 oz), SpO2 93 %. General:    Well nourished. No overt distress. Looks frail  Head:  Normocephalic, atraumatic  Eyes:  Sclerae appear normal.  Pupils equally round. ENT:  Nares appear normal, no drainage. Moist oral mucosa  Neck:  No restricted ROM. Trachea midline   CV:   Irregular irregularly, tachycardic. No m/r/g. No jugular venous distension. Lungs:   Decreased breath sounds. No wheezing, rhonchi, or rales. Respirations even, unlabored  Abdomen: Bowel sounds present. Soft, nontender, nondistended. Extremities: No cyanosis or clubbing. No edema  Skin:     No rashes and normal coloration. Warm and dry. Very small area on sacrum that family pointed out  Neuro:  CN II-XII grossly intact. Sensation intact. A&Ox3  Psych:  Normal mood and affect. I have reviewed ordered lab tests and independently visualized imaging below:    Last 24hr Labs:  Recent Results (from the past 24 hour(s))   CBC WITH AUTOMATED DIFF    Collection Time: 01/07/22 11:13 AM   Result Value Ref Range    WBC 13.4 (H) 4.3 - 11.1 K/uL    RBC 4.48 4.05 - 5.2 M/uL    HGB 13.4 11.7 - 15.4 g/dL    HCT 41.5 35.8 - 46.3 %    MCV 92.6 79.6 - 97.8 FL    MCH 29.9 26.1 - 32.9 PG    MCHC 32.3 31.4 - 35.0 g/dL    RDW 14.7 (H) 11.9 - 14.6 %    PLATELET 921 (H) 028 - 450 K/uL    MPV 9.5 9.4 - 12.3 FL    ABSOLUTE NRBC 0.00 0.0 - 0.2 K/uL    DF AUTOMATED      NEUTROPHILS 85 (H) 43 - 78 %    LYMPHOCYTES 7 (L) 13 - 44 %    MONOCYTES 7 4.0 - 12.0 %    EOSINOPHILS 0 (L) 0.5 - 7.8 %    BASOPHILS 0 0.0 - 2.0 %    IMMATURE GRANULOCYTES 1 0.0 - 5.0 %    ABS. NEUTROPHILS 11.4 (H) 1.7 - 8.2 K/UL    ABS. LYMPHOCYTES 1.0 0.5 - 4.6 K/UL    ABS. MONOCYTES 0.9 0.1 - 1.3 K/UL    ABS. EOSINOPHILS 0.0 0.0 - 0.8 K/UL    ABS. BASOPHILS 0.1 0.0 - 0.2 K/UL    ABS. IMM.  GRANS. 0.1 0.0 - 0.5 K/UL   METABOLIC PANEL, COMPREHENSIVE    Collection Time: 01/07/22 11:13 AM   Result Value Ref Range    Sodium 134 (L) 136 - 145 mmol/L    Potassium 4.0 3.5 - 5.1 mmol/L    Chloride 100 98 - 107 mmol/L    CO2 26 21 - 32 mmol/L    Anion gap 8 7 - 16 mmol/L    Glucose 180 (H) 65 - 100 mg/dL    BUN 19 8 - 23 MG/DL    Creatinine 1.07 (H) 0.6 - 1.0 MG/DL    GFR est AA >60 >60 ml/min/1.73m2    GFR est non-AA 50 (L) >60 ml/min/1.73m2    Calcium 9.2 8.3 - 10.4 MG/DL    Bilirubin, total 0.8 0.2 - 1.1 MG/DL    ALT (SGPT) 30 12 - 65 U/L    AST (SGOT) 31 15 - 37 U/L    Alk.  phosphatase 117 50 - 136 U/L    Protein, total 7.0 6.3 - 8.2 g/dL    Albumin 2.5 (L) 3.2 - 4.6 g/dL    Globulin 4.5 (H) 2.3 - 3.5 g/dL    A-G Ratio 0.6 (L) 1.2 - 3.5     PROCALCITONIN    Collection Time: 01/07/22 11:13 AM   Result Value Ref Range    Procalcitonin <0.05 0.00 - 0.49 ng/mL   DIGOXIN    Collection Time: 01/07/22 11:13 AM   Result Value Ref Range    Digoxin level 0.7 (L) 0.90 - 2.10 NG/ML   EKG, 12 LEAD, INITIAL    Collection Time: 01/07/22 11:42 AM   Result Value Ref Range    Ventricular Rate 122 BPM    Atrial Rate 146 BPM    QRS Duration 103 ms    Q-T Interval 335 ms    QTC Calculation (Bezet) 478 ms    Calculated R Axis 63 degrees    Calculated T Axis -38 degrees    Diagnosis       Atrial fibrillation  Premature ventricular complexes  Non specific ST/T wave changes    Confirmed by Ashli Diaz MD (), M[place]QUI[place]ES ([id number]) on 1/7/2022 1:52:21 PM     COVID-19 RAPID TEST    Collection Time: 01/07/22 11:57 AM   Result Value Ref Range    Specimen source NASAL      COVID-19 rapid test Not detected NOTD     LACTIC ACID    Collection Time: 01/07/22 11:57 AM   Result Value Ref Range    Lactic acid 2.1 (H) 0.4 - 2.0 MMOL/L   NT-PRO BNP    Collection Time: 01/07/22 11:58 AM   Result Value Ref Range    NT pro-BNP 4,161 (H) <450 PG/ML   LACTIC ACID    Collection Time: 01/07/22  1:49 PM   Result Value Ref Range    Lactic acid 2.0 0.4 - 2.0 MMOL/L   URINALYSIS W/ RFLX MICROSCOPIC    Collection Time: 01/07/22  1:56 PM   Result Value Ref Range    Color YELLOW      Appearance CLEAR      Specific gravity 1.008 1.001 - 1.023      pH (UA) 5.5 5.0 - 9.0      Protein Negative NEG mg/dL    Glucose Negative mg/dL    Ketone Negative NEG mg/dL    Bilirubin Negative NEG      Blood TRACE (A) NEG      Urobilinogen 0.2 0.2 - 1.0 EU/dL    Nitrites Negative NEG      Leukocyte Esterase Negative NEG      Bacteria 0 0 /hpf    Other observations RESULTS VERIFIED MANUALLY         All Micro Results     Procedure Component Value Units Date/Time    COVID-19 RAPID TEST [[id number]] Collected: 01/07/22 1157    Order Status: Completed Specimen: Nasopharyngeal Updated: 01/07/22 1231     Specimen source NASAL        COVID-19 rapid test Not detected        Comment:      The specimen is NEGATIVE for SARS-CoV-2, the novel coronavirus associated with COVID-19. A negative result does not rule out COVID-19. This test has been authorized by the FDA under an Emergency Use Authorization (EUA) for use by authorized laboratories. Fact sheet for Healthcare Providers: Screwpulpdate.co.nz  Fact sheet for Patients: ScrewpulpdaCOLOURlovers.co.nz       Methodology: Isothermal Nucleic Acid Amplification         BLOOD CULTURE [[id number]] Collected: 01/07/22 1157    Order Status: Completed Specimen: Blood Updated: 01/07/22 1221    BLOOD CULTURE [[id number]] Collected: 01/07/22 1158    Order Status: Completed Specimen: Blood Updated: 01/07/22 1221          Other Studies:  CT HEAD WO CONT    Result Date: 1/7/2022  CT of the Head and Cervical Spine INDICATION:  Increasing weakness and confusion Multiple axial images obtained through the brain without intravenous contrast. High resolution axial images were then obtained through the cervical spine. Coronal and sagittal reformats were also evaluated. Radiation dose reduction techniques were used for this study:   All CT scans performed at this facility use one or all of the following: Automated exposure control, adjustment of the mA and/or kVp according to patient's size, iterative reconstruction. Head CT:  No areas of abnormal attenuation are seen in the brain. There is no CT evidence of acute hemorrhage or infarction. The ventricles are normal in size. There are no extra-axial fluid collections. No masses are seen. There are no bony lesions. Cervical Spine CT:  There is no evidence of fracture or subluxation. No bony lesions are seen. There is no prevertebral soft tissue swelling. There is mild degenerative disc disease in the lower cervical spine. There are bilateral pulmonary infiltrates and effusions. 1.  No CT evidence of acute intracranial abnormality. 2.  No evidence of cervical spine fracture. 3.  Bilateral pulmonary infiltrates and effusions. CT SPINE CERV WO CONT    Result Date: 1/7/2022  CT of the Head and Cervical Spine INDICATION:  Increasing weakness and confusion Multiple axial images obtained through the brain without intravenous contrast. High resolution axial images were then obtained through the cervical spine. Coronal and sagittal reformats were also evaluated. Radiation dose reduction techniques were used for this study: All CT scans performed at this facility use one or all of the following: Automated exposure control, adjustment of the mA and/or kVp according to patient's size, iterative reconstruction. Head CT:  No areas of abnormal attenuation are seen in the brain. There is no CT evidence of acute hemorrhage or infarction. The ventricles are normal in size. There are no extra-axial fluid collections. No masses are seen. There are no bony lesions. Cervical Spine CT:  There is no evidence of fracture or subluxation. No bony lesions are seen. There is no prevertebral soft tissue swelling. There is mild degenerative disc disease in the lower cervical spine. There are bilateral pulmonary infiltrates and effusions. 1.  No CT evidence of acute intracranial abnormality. 2.  No evidence of cervical spine fracture. 3.  Bilateral pulmonary infiltrates and effusions. XR CHEST PORT    Result Date: 1/7/2022  Chest X-ray INDICATION: Increasing weakness and shortness of breath A portable AP view of the chest was obtained. FINDINGS: There are increased bilateral pulmonary infiltrates, right slightly greater than left. There is stable cardiomegaly. Pacemaker is present. Increased bilateral pulmonary infiltrate       Medications Administered     cefepime (MAXIPIME) 2 g in 0.9% sodium chloride (MBP/ADV) 100 mL MBP     Admin Date  01/07/2022 Action  New Bag Dose  2 g Rate  200 mL/hr Route  IntraVENous Administered By  Edy Moran RN          dilTIAZem (CARDIZEM) injection 10 mg     Admin Date  01/07/2022 Action  Given Dose  10 mg Route  IntraVENous Administered By  Edy Moran RN          furosemide (LASIX) injection 40 mg     Admin Date  01/07/2022 Action  Given Dose  40 mg Route  IntraVENous Administered By  Edy Moran RN          sodium chloride 0.9 % bolus infusion 1,000 mL     Admin Date  01/07/2022 Action  New Bag Dose  1,000 mL Route  IntraVENous Administered By  Edy Moran RN                Signed:  Ash Terrell MD    Part of this note may have been written by using a voice dictation software. The note has been proof read but may still contain some grammatical/other typographical errors.

## 2022-01-07 NOTE — ED NOTES
Suction on pt's purewick came loose due to pt's restlessness and pt urinated all over the bed. Pt's bedding removed. Pt cleaned completely. New purewick with extension tubing placed. Pt placed in new brief and gown. Pt's bedding completely changed. Pt re-educated on use of purewick.

## 2022-01-07 NOTE — ED PROVIDER NOTES
22-year-old female with history of hypothyroidism, hypertension, CHF, atrial fibrillation status post pacemaker placement presents via EMS from home after she was reportedly found down beside her bed this morning. Family state that over the past week she has had increased generalized weakness, fatigue, increased confusion. According to daughter present at bedside, PCP recently sent off for urinalysis but have not received any results. Family report the patient has a chronic sacral wound. Daughter states that caretaker concern for UTI as she has had increased urinary frequency. States the patient with increased shortness of breath. States that she has been vaccinated. Reports increased cough. Patient noted to be hypoxic on arrival and placed on 2 L O2 via nasal cannula. The history is provided by the patient and a relative. No  was used. Lethargy  This is a new problem. The current episode started more than 2 days ago. The problem occurs constantly. The problem has not changed since onset. Associated symptoms include shortness of breath. Pertinent negatives include no chest pain, no abdominal pain and no headaches. Nothing relieves the symptoms. She has tried nothing for the symptoms. The treatment provided no relief.         Past Medical History:   Diagnosis Date    Acute kidney injury ([facility] [address] [address] 12/29/2013    Atrial fibrillation (HCC)     pacemaker    Bell's palsy     Benign hypertensive heart disease without heart failure     Bowel obstruction (HCC) 8/20/2011    Congestive heart failure, unspecified     Diverticulosis     Flat foot(734)     Hemorrhage of rectum and anus     Hiatal hernia     Hyperlipidemia     Hypertension     Hypertonicity of bladder     Hypothyroidism     Loss of height     Menopause     Osteoporosis     Reflux esophagitis     Restless legs syndrome (RLS)     Slow transit constipation     Stricture and stenosis of esophagus     Unspecified gastritis and gastroduodenitis without mention of hemorrhage     Unspecified hemorrhoids with other complication     Unspecified hereditary and idiopathic peripheral neuropathy     Urge incontinence        Past Surgical History:   Procedure Laterality Date    HX APPENDECTOMY      HX CHOLECYSTECTOMY      HX HEENT      HX HYSTERECTOMY      HX PACEMAKER      HX TONSILLECTOMY      NEUROLOGICAL PROCEDURE UNLISTED      carpal tunnel both hands    VA BREAST SURGERY PROCEDURE UNLISTED      cyst         Family History:   Problem Relation Age of Onset    Cancer Mother         Pancreatic    Heart Disease Mother     Heart Disease Father     Stroke Other     Diabetes Other     Cancer Other     Depression Child        Social History     Socioeconomic History    Marital status:      Spouse name: Not on file    Number of children: Not on file    Years of education: Not on file    Highest education level: Not on file   Occupational History    Not on file   Tobacco Use    Smoking status: Never Smoker    Smokeless tobacco: Never Used   Vaping Use    Vaping Use: Never used   Substance and Sexual Activity    Alcohol use: No    Drug use: No    Sexual activity: Not Currently   Other Topics Concern    Not on file   Social History Narrative    Not on file     Social Determinants of Health     Financial Resource Strain:     Difficulty of Paying Living Expenses: Not on file   Food Insecurity:     Worried About Running Out of Food in the Last Year: Not on file    [place] of Food in the Last Year: Not on file   Transportation Needs:     Lack of Transportation (Medical): Not on file    Lack of Transportation (Non-Medical):  Not on file   Physical Activity:     Days of Exercise per Week: Not on file    Minutes of Exercise per Session: Not on file   Stress:     Feeling of Stress : Not on file   Social Connections:     Frequency of Communication with Friends and Family: Not on file    Frequency of Social Gatherings with Friends and Family: Not on file    Attends Anabaptist Services: Not on file    Active Member of Clubs or Organizations: Not on file    Attends Club or Organization Meetings: Not on file    Marital Status: Not on file   Intimate Partner Violence:     Fear of Current or Ex-Partner: Not on file    Emotionally Abused: Not on file    Physically Abused: Not on file    Sexually Abused: Not on file   Housing Stability:     Unable to Pay for Housing in the Last Year: Not on file    Number of [place] in the Last Year: Not on file    Unstable Housing in the Last Year: Not on file         ALLERGIES: Erythromycin and Sulfa (sulfonamide antibiotics)    Review of Systems   Constitutional: Positive for fatigue. Negative for chills, diaphoresis and fever. HENT: Negative for congestion and rhinorrhea. Eyes: Negative for photophobia and visual disturbance. Respiratory: Positive for cough and shortness of breath. Cardiovascular: Negative for chest pain and palpitations. Gastrointestinal: Negative for abdominal pain, diarrhea, nausea and vomiting. Genitourinary: Negative for flank pain and hematuria. Musculoskeletal: Negative for arthralgias and myalgias. Skin: Negative for color change and rash. Neurological: Negative for dizziness, syncope, facial asymmetry, speech difficulty, light-headedness and headaches. Hematological: Does not bruise/bleed easily. Psychiatric/Behavioral: Positive for confusion. Vitals:    01/07/22 1104 01/07/22 1117   BP: (!) 152/87 137/80   Pulse: (!) 120    Resp: 16    Temp: 98.3 °F (36.8 °C)    SpO2: 91% 91%   Weight: 74.2 kg (163 lb 8 oz)    Height: 5' 6\" (1.676 m)             Physical Exam  Vitals and nursing note reviewed. Constitutional:       Appearance: She is ill-appearing. HENT:      Head: Normocephalic and atraumatic.       Nose: Nose normal.      Mouth/Throat:      Mouth: Mucous membranes are moist.   Eyes:      Pupils: Pupils are equal, round, and reactive to light. Neck:      Comments: FROM. No step-off. Cardiovascular:      Rate and Rhythm: Tachycardia present. Rhythm irregular. Pulses: Normal pulses. Pulmonary:      Effort: Pulmonary effort is normal.      Comments: Coarse breath sounds noted bilaterally. Abdominal:      General: Bowel sounds are normal.      Palpations: Abdomen is soft. Tenderness: There is no abdominal tenderness. There is no guarding or rebound. Musculoskeletal:         General: No tenderness or deformity. Normal range of motion. Cervical back: Normal range of motion. No rigidity. Skin:     General: Skin is warm. Findings: No erythema or rash. Neurological:      Mental Status: She is alert. MDM  Number of Diagnoses or Management Options  Bilateral pulmonary infiltrates on chest x-ray: new and requires workup  Hypoxia: new and requires workup  Diagnosis management comments: Patient hypoxic. Patient placed on 2 L O2 via nasal cannula. Rapid Covid negative. Patient noted to have elevated white blood cell count 13.4. Lactic acid 2.1. Blood cultures obtained. Will cover with cefepime. Chest x-ray with increased pulmonary infiltrates. CT head, CT C-spine with no acute concerning findings. Heart rate in 120s with A. fib. Will order dose of Cardizem 2 mg IV. Additionally patient with evidence of worsening bilateral pleural effusions on CT. Lasix IV ordered  Hospitalist consulted for admission.        Amount and/or Complexity of Data Reviewed  Clinical lab tests: ordered and reviewed  Tests in the radiology section of CPT®: ordered and reviewed  Tests in the medicine section of CPT®: ordered and reviewed  Review and summarize past medical records: yes  Discuss the patient with other providers: yes  Independent visualization of images, tracings, or specimens: yes    Risk of Complications, Morbidity, and/or Mortality  Presenting problems: high  Diagnostic procedures: moderate  Management options: moderate    Patient Progress  Patient progress: stable    ED Course as of 01/07/22 1329   Fri Jan 07, 2022   1136 WBC(!): 13.4 [DF]   1316 COVID-19 rapid test: Not detected [DF]   1316 Lactic acid(!): 2.1 [DF]   1316 CXR   FINDINGS: There are increased bilateral pulmonary infiltrates, right slightly  greater than left. There is stable cardiomegaly. Pacemaker is present.       IMPRESSION  Increased bilateral pulmonary infiltrate [DF]      ED Course User Index  [DF] Christianne Hong MD       EKG    Date/Time: 1/7/2022 1:31 PM  Performed by: Christianne Hong MD  Authorized by: Christianne Hong MD     ECG reviewed by ED Physician in the absence of a cardiologist: yes    Rate:     ECG rate:  122    ECG rate assessment: tachycardic    Rhythm:     Rhythm: atrial fibrillation    Ectopy:     Ectopy: none    QRS:     QRS axis:  Normal    QRS intervals:  Normal  Conduction:     Conduction: normal    ST segments:     ST segments:  Normal  T waves:     T waves: normal        CT HEAD WO CONT   Final Result   1. No CT evidence of acute intracranial abnormality. 2.  No evidence of cervical spine fracture. 3.  Bilateral pulmonary infiltrates and effusions. CT SPINE CERV WO CONT   Final Result   1. No CT evidence of acute intracranial abnormality. 2.  No evidence of cervical spine fracture. 3.  Bilateral pulmonary infiltrates and effusions.              XR CHEST PORT   Final Result   Increased bilateral pulmonary infiltrate               Results Include:    Recent Results (from the past 24 hour(s))   URINALYSIS W/MICROSCOPIC    Collection Time: 01/06/22  4:08 PM   Result Value Ref Range    Specific Gravity 1.011 1.005 - 1.030    pH (UA) 6.0 5.0 - 7.5    Color Yellow Yellow    Appearance Clear Clear    Leukocyte Esterase Trace (A) Negative    Protein Negative Negative/Trace    Glucose Negative Negative    Ketone Negative Negative    Blood 1+ (A) Negative    Bilirubin Negative Negative    Urobilinogen 0.2 0.2 - 1.0 mg/dL    Nitrites Negative Negative    Microscopic Examination See additional order    MICROSCOPIC EXAMINATION    Collection Time: 01/06/22  4:08 PM   Result Value Ref Range    WBC 0-5 0 - 5 /hpf    RBC None seen 0 - 2 /hpf    Epithelial cells 0-10 0 - 10 /hpf    Casts None seen None seen /lpf    Bacteria None seen None seen/Few   CBC WITH AUTOMATED DIFF    Collection Time: 01/07/22 11:13 AM   Result Value Ref Range    WBC 13.4 (H) 4.3 - 11.1 K/uL    RBC 4.48 4.05 - 5.2 M/uL    HGB 13.4 11.7 - 15.4 g/dL    HCT 41.5 35.8 - 46.3 %    MCV 92.6 79.6 - 97.8 FL    MCH 29.9 26.1 - 32.9 PG    MCHC 32.3 31.4 - 35.0 g/dL    RDW 14.7 (H) 11.9 - 14.6 %    PLATELET 951 (H) 637 - 450 K/uL    MPV 9.5 9.4 - 12.3 FL    ABSOLUTE NRBC 0.00 0.0 - 0.2 K/uL    DF AUTOMATED      NEUTROPHILS 85 (H) 43 - 78 %    LYMPHOCYTES 7 (L) 13 - 44 %    MONOCYTES 7 4.0 - 12.0 %    EOSINOPHILS 0 (L) 0.5 - 7.8 %    BASOPHILS 0 0.0 - 2.0 %    IMMATURE GRANULOCYTES 1 0.0 - 5.0 %    ABS. NEUTROPHILS 11.4 (H) 1.7 - 8.2 K/UL    ABS. LYMPHOCYTES 1.0 0.5 - 4.6 K/UL    ABS. MONOCYTES 0.9 0.1 - 1.3 K/UL    ABS. EOSINOPHILS 0.0 0.0 - 0.8 K/UL    ABS. BASOPHILS 0.1 0.0 - 0.2 K/UL    ABS. IMM. GRANS. 0.1 0.0 - 0.5 K/UL   METABOLIC PANEL, COMPREHENSIVE    Collection Time: 01/07/22 11:13 AM   Result Value Ref Range    Sodium 134 (L) 136 - 145 mmol/L    Potassium 4.0 3.5 - 5.1 mmol/L    Chloride 100 98 - 107 mmol/L    CO2 26 21 - 32 mmol/L    Anion gap 8 7 - 16 mmol/L    Glucose 180 (H) 65 - 100 mg/dL    BUN 19 8 - 23 MG/DL    Creatinine 1.07 (H) 0.6 - 1.0 MG/DL    GFR est AA >60 >60 ml/min/1.73m2    GFR est non-AA 50 (L) >60 ml/min/1.73m2    Calcium 9.2 8.3 - 10.4 MG/DL    Bilirubin, total 0.8 0.2 - 1.1 MG/DL    ALT (SGPT) 30 12 - 65 U/L    AST (SGOT) 31 15 - 37 U/L    Alk.  phosphatase 117 50 - 136 U/L    Protein, total 7.0 6.3 - 8.2 g/dL    Albumin 2.5 (L) 3.2 - 4.6 g/dL    Globulin 4.5 (H) 2.3 - 3.5 g/dL    A-G Ratio 0.6 (L) 1.2 - 3.5     PROCALCITONIN    Collection Time: 01/07/22 11:13 AM   Result Value Ref Range    Procalcitonin <0.05 0.00 - 0.49 ng/mL   DIGOXIN    Collection Time: 01/07/22 11:13 AM   Result Value Ref Range    Digoxin level 0.7 (L) 0.90 - 2.10 NG/ML   COVID-19 RAPID TEST    Collection Time: 01/07/22 11:57 AM   Result Value Ref Range    Specimen source NASAL      COVID-19 rapid test Not detected NOTD     LACTIC ACID    Collection Time: 01/07/22 11:57 AM   Result Value Ref Range    Lactic acid 2.1 (H) 0.4 - 2.0 MMOL/L     Damon Steger Chriss Hammans., MD; 1/7/2022 @1:34 PM Voice dictation software was used during the making of this note. This software is not perfect and grammatical and other typographical errors may be present.   This note has not been proofread for errors.  ===================================================================

## 2022-01-07 NOTE — PROGRESS NOTES
Chart review complete, CM met with daughter Yokasta Caba [phone] and pt at bedside, pt does not answer questions daughter answers all questions, per daughter pt lives with her has been for while, states when pt is left alone at home or family is out of town they have caregivers that stay and care for pt, states they live in 2 level home and now have an elevator to get pt from living quarters to [place]. Per daughter pt needs assistance with ADLS, states pt has walker and cane that she uses in the home. Demographics, insurance and PCP confirmed. Pt and family made aware CM staff will remain available to assist with dc needs during hospital stay. Care Management Interventions  PCP Verified by CM:  Yes (Dr Ava Green last visit November 2021)  MyChart Signup: No  Discharge Durable Medical Equipment: No  Physical Therapy Consult: No  Occupational Therapy Consult: No  Speech Therapy Consult: No  Support Systems: Child(fransisca) (daughter Yokasta Caba [phone])  Confirm Follow Up Transport: Family  Discharge Location  Discharge Placement: Unable to determine at this time

## 2022-01-07 NOTE — PROGRESS NOTES
Asked to see pt. Chart review shows pt sees Dr Daniel Jasso. Please call his group for management. We are of course available if there is an emergency.

## 2022-01-07 NOTE — ED TRIAGE NOTES
Pt arrived via EMS from home. Over the past week pt has increased generalized weakness and increased confusion. PCP sent off urine test but awaiting results. Family reports a sacral wound as well. /62,  paced with hx of afib, o2 sat 94% on RA, temp 97.5F oral.  A+O x4 with EMS at this time.

## 2022-01-07 NOTE — ED NOTES
TRANSFER - OUT REPORT:    Verbal report given to SARA Kay on Everardo Rodriguez  being transferred to Room #158 for routine progression of care       Report consisted of patients Situation, Background, Assessment and   Recommendations(SBAR). Information from the following report(s) SBAR, Kardex, ED Summary, Intake/Output, MAR and Recent Results was reviewed with the receiving nurse. Lines:   Peripheral IV 01/07/22 Left Antecubital (Active)   Site Assessment Clean, dry, & intact 01/07/22 1106   Phlebitis Assessment 0 01/07/22 1106   Infiltration Assessment 0 01/07/22 1106   Dressing Status Clean, dry, & intact 01/07/22 1106   Dressing Type Transparent 01/07/22 1106   Hub Color/Line Status Pink 01/07/22 1106       Peripheral IV 01/07/22 Right Antecubital (Active)   Site Assessment Clean, dry, & intact 01/07/22 1156   Phlebitis Assessment 0 01/07/22 1156   Infiltration Assessment 0 01/07/22 1156   Dressing Status Clean, dry, & intact 01/07/22 1156   Dressing Type Transparent 01/07/22 1156   Hub Color/Line Status Pink 01/07/22 1156        Opportunity for questions and clarification was provided.       Patient transported with:   O2 @ 2 liters  Tech

## 2022-01-08 LAB
ALBUMIN SERPL-MCNC: 2.3 G/DL (ref 3.2–4.6)
ALBUMIN/GLOB SERPL: 0.5 {RATIO} (ref 1.2–3.5)
ALP SERPL-CCNC: 100 U/L (ref 50–136)
ALT SERPL-CCNC: 25 U/L (ref 12–65)
ANION GAP SERPL CALC-SCNC: 8 MMOL/L (ref 7–16)
APTT PPP: 48.4 SEC (ref 24.1–35.1)
AST SERPL-CCNC: 29 U/L (ref 15–37)
BASOPHILS # BLD: 0.1 K/UL (ref 0–0.2)
BASOPHILS NFR BLD: 1 % (ref 0–2)
BILIRUB SERPL-MCNC: 0.7 MG/DL (ref 0.2–1.1)
BUN SERPL-MCNC: 17 MG/DL (ref 8–23)
CALCIUM SERPL-MCNC: 8.4 MG/DL (ref 8.3–10.4)
CHLORIDE SERPL-SCNC: 105 MMOL/L (ref 98–107)
CO2 SERPL-SCNC: 26 MMOL/L (ref 21–32)
CREAT SERPL-MCNC: 0.98 MG/DL (ref 0.6–1)
DIFFERENTIAL METHOD BLD: ABNORMAL
EOSINOPHIL # BLD: 0.5 K/UL (ref 0–0.8)
EOSINOPHIL NFR BLD: 4 % (ref 0.5–7.8)
ERYTHROCYTE [DISTWIDTH] IN BLOOD BY AUTOMATED COUNT: 14.9 % (ref 11.9–14.6)
GLOBULIN SER CALC-MCNC: 4.2 G/DL (ref 2.3–3.5)
GLUCOSE SERPL-MCNC: 150 MG/DL (ref 65–100)
HCT VFR BLD AUTO: 38.3 % (ref 35.8–46.3)
HGB BLD-MCNC: 12.3 G/DL (ref 11.7–15.4)
IMM GRANULOCYTES # BLD AUTO: 0 K/UL (ref 0–0.5)
IMM GRANULOCYTES NFR BLD AUTO: 0 % (ref 0–5)
INR PPP: 1.4
LYMPHOCYTES # BLD: 1.3 K/UL (ref 0.5–4.6)
LYMPHOCYTES NFR BLD: 10 % (ref 13–44)
MAGNESIUM SERPL-MCNC: 2.2 MG/DL (ref 1.8–2.4)
MCH RBC QN AUTO: 29.9 PG (ref 26.1–32.9)
MCHC RBC AUTO-ENTMCNC: 32.1 G/DL (ref 31.4–35)
MCV RBC AUTO: 93 FL (ref 79.6–97.8)
MONOCYTES # BLD: 1 K/UL (ref 0.1–1.3)
MONOCYTES NFR BLD: 8 % (ref 4–12)
NEUTS SEG # BLD: 9.7 K/UL (ref 1.7–8.2)
NEUTS SEG NFR BLD: 77 % (ref 43–78)
NRBC # BLD: 0 K/UL (ref 0–0.2)
PLATELET # BLD AUTO: 445 K/UL (ref 150–450)
PMV BLD AUTO: 9.4 FL (ref 9.4–12.3)
POTASSIUM SERPL-SCNC: 3.9 MMOL/L (ref 3.5–5.1)
PROT SERPL-MCNC: 6.5 G/DL (ref 6.3–8.2)
PROTHROMBIN TIME: 17.1 SEC (ref 12.6–14.5)
RBC # BLD AUTO: 4.12 M/UL (ref 4.05–5.2)
SODIUM SERPL-SCNC: 139 MMOL/L (ref 136–145)
WBC # BLD AUTO: 12.7 K/UL (ref 4.3–11.1)

## 2022-01-08 PROCEDURE — 65270000029 HC RM PRIVATE

## 2022-01-08 PROCEDURE — 85025 COMPLETE CBC W/AUTO DIFF WBC: CPT

## 2022-01-08 PROCEDURE — 85730 THROMBOPLASTIN TIME PARTIAL: CPT

## 2022-01-08 PROCEDURE — 74011250637 HC RX REV CODE- 250/637: Performed by: FAMILY MEDICINE

## 2022-01-08 PROCEDURE — 97162 PT EVAL MOD COMPLEX 30 MIN: CPT

## 2022-01-08 PROCEDURE — 74011000258 HC RX REV CODE- 258: Performed by: STUDENT IN AN ORGANIZED HEALTH CARE EDUCATION/TRAINING PROGRAM

## 2022-01-08 PROCEDURE — 97530 THERAPEUTIC ACTIVITIES: CPT

## 2022-01-08 PROCEDURE — 36415 COLL VENOUS BLD VENIPUNCTURE: CPT

## 2022-01-08 PROCEDURE — 83735 ASSAY OF MAGNESIUM: CPT

## 2022-01-08 PROCEDURE — 74011250637 HC RX REV CODE- 250/637: Performed by: STUDENT IN AN ORGANIZED HEALTH CARE EDUCATION/TRAINING PROGRAM

## 2022-01-08 PROCEDURE — 2709999900 HC NON-CHARGEABLE SUPPLY

## 2022-01-08 PROCEDURE — 74011250636 HC RX REV CODE- 250/636: Performed by: STUDENT IN AN ORGANIZED HEALTH CARE EDUCATION/TRAINING PROGRAM

## 2022-01-08 PROCEDURE — 80053 COMPREHEN METABOLIC PANEL: CPT

## 2022-01-08 PROCEDURE — 85610 PROTHROMBIN TIME: CPT

## 2022-01-08 PROCEDURE — 74011000250 HC RX REV CODE- 250: Performed by: STUDENT IN AN ORGANIZED HEALTH CARE EDUCATION/TRAINING PROGRAM

## 2022-01-08 RX ORDER — CYCLOBENZAPRINE HCL 10 MG
5 TABLET ORAL
Status: DISCONTINUED | OUTPATIENT
Start: 2022-01-08 | End: 2022-01-11 | Stop reason: HOSPADM

## 2022-01-08 RX ORDER — DILTIAZEM HYDROCHLORIDE 30 MG/1
30 TABLET, FILM COATED ORAL
Status: DISCONTINUED | OUTPATIENT
Start: 2022-01-08 | End: 2022-01-11 | Stop reason: HOSPADM

## 2022-01-08 RX ADMIN — PANTOPRAZOLE SODIUM 40 MG: 40 TABLET, DELAYED RELEASE ORAL at 06:24

## 2022-01-08 RX ADMIN — DILTIAZEM HYDROCHLORIDE 30 MG: 30 TABLET, FILM COATED ORAL at 12:15

## 2022-01-08 RX ADMIN — DIGOXIN 0.06 MG: 125 TABLET ORAL at 09:12

## 2022-01-08 RX ADMIN — LEVOTHYROXINE SODIUM 50 MCG: 0.05 TABLET ORAL at 06:24

## 2022-01-08 RX ADMIN — DILTIAZEM HYDROCHLORIDE 30 MG: 30 TABLET, FILM COATED ORAL at 21:06

## 2022-01-08 RX ADMIN — DILTIAZEM HYDROCHLORIDE 30 MG: 30 TABLET, FILM COATED ORAL at 17:08

## 2022-01-08 RX ADMIN — METOPROLOL TARTRATE 75 MG: 50 TABLET, FILM COATED ORAL at 17:08

## 2022-01-08 RX ADMIN — METOPROLOL TARTRATE 75 MG: 50 TABLET, FILM COATED ORAL at 09:12

## 2022-01-08 RX ADMIN — CYCLOBENZAPRINE 5 MG: 10 TABLET, FILM COATED ORAL at 13:54

## 2022-01-08 RX ADMIN — CEFEPIME HYDROCHLORIDE 2 G: 2 INJECTION, POWDER, FOR SOLUTION INTRAVENOUS at 00:44

## 2022-01-08 RX ADMIN — RIVAROXABAN 15 MG: 15 TABLET, FILM COATED ORAL at 09:12

## 2022-01-08 RX ADMIN — SODIUM CHLORIDE, PRESERVATIVE FREE 10 ML: 5 INJECTION INTRAVENOUS at 21:06

## 2022-01-08 RX ADMIN — DILTIAZEM HYDROCHLORIDE 30 MG: 30 TABLET, FILM COATED ORAL at 09:12

## 2022-01-08 RX ADMIN — ATORVASTATIN CALCIUM 10 MG: 10 TABLET, FILM COATED ORAL at 09:13

## 2022-01-08 RX ADMIN — CEFEPIME HYDROCHLORIDE 2 G: 2 INJECTION, POWDER, FOR SOLUTION INTRAVENOUS at 12:15

## 2022-01-08 RX ADMIN — SODIUM CHLORIDE, PRESERVATIVE FREE 10 ML: 5 INJECTION INTRAVENOUS at 06:25

## 2022-01-08 RX ADMIN — DILTIAZEM HYDROCHLORIDE 30 MG: 30 TABLET, FILM COATED ORAL at 00:45

## 2022-01-08 RX ADMIN — CALCIUM CARBONATE (ANTACID) CHEW TAB 500 MG 600 MG: 500 CHEW TAB at 12:15

## 2022-01-08 RX ADMIN — FUROSEMIDE 40 MG: 10 INJECTION, SOLUTION INTRAMUSCULAR; INTRAVENOUS at 09:13

## 2022-01-08 RX ADMIN — SODIUM CHLORIDE, PRESERVATIVE FREE 10 ML: 5 INJECTION INTRAVENOUS at 13:59

## 2022-01-08 NOTE — PROGRESS NOTES
Physician Progress Note      Ericka King  CSN #:                  [id number]  :                       3/11/1923  ADMIT DATE:       2022 10:48 AM  DISCH DATE:  RESPONDING  PROVIDER #:        Magan Mackey MD          QUERY TEXT:    Patient admitted with pneumonia, noted to have atrial fibrillation and is maintained on Xarelto. If possible, please document in progress notes and discharge summary if you are evaluating and/or treating any of the following: The medical record reflects the following:  Risk Factors: 80 yr, female, CHF, HTN, Afib  Clinical Indicators: 80year old, female, 152/87, 120  Treatment: Xarelto,  Options provided:  -- Secondary hypercoagulable state in a patient with atrial fibrillation  -- Other - I will add my own diagnosis  -- Disagree - Not applicable / Not valid  -- Disagree - Clinically unable to determine / Unknown  -- Refer to Clinical Documentation Reviewer    PROVIDER RESPONSE TEXT:    This patient has secondary hypercoagulable state related to atrial fibrillation.     Query created by: Marleny Freedman on 2022 10:44 AM      Electronically signed by:  Magan Mackey MD 2022 10:49 AM

## 2022-01-08 NOTE — PROGRESS NOTES
Hospitalist Progress Note   Admit Date:  2022 10:48 AM   Name:  Luis Alfredo Walden   Age:  80 y.o. Sex:  female  :  3/11/1923   MRN:  [id number]   Room:  [facility]    Presenting Complaint: Lethargy    Reason(s) for Admission: CAP (community acquired pneumonia) [J18.9]     Hospital Course & Interval History:   Luis Alfredo Walden is a 80 y.o. female with medical history of atrial fibrillation on Xarelto, hypothyroidism, CHF, hypertension, pacemaker who presented with confusion, generalized weaknes and shortness of breath. CT head/spine negative for any acute abnormalities. Chest x-ray showed increasing bilateral infiltrates. CT showed bilateral pulmonary infiltrates and effusion. Patient admitted with possible community-acquired pneumonia. Patient also noted with A. fib with RVR and had been given one-time 10 mg IV dose of Cardizem in the ED. Subjective (22): Patient is seen at the bedside. Reports she is feeling better. But complaining of leg cramps. Shortness of breath improving. Denies chest pain, palpitation, nausea, vomiting or abdominal pain. Daughter at the bedside. Updated on patient's current condition.     Assessment & Plan:     Community-acquired pneumonia  Chest x-ray with bilateral infiltrates  Patient presented with hypoxia now currently on 3 L at 93%  Continue supplemental oxygen to maintain oxygen saturation greater than 90%  Continue cefepime  UA negative  --SLP eval to rule out aspiration     Congestive heart failure  BNP elevated greater than 4000  Patient given one-time dose Lasix in ED  Continue Lasix daily IV     Atrial fibrillation with RVR  Patient currently on digoxin but takes half of the 12.5 mg dose since levels were elevated last time she saw her PCP  Has been given one-time 10 mg IV dose of Cardizem  Cardiology consulted  Patient has been compliant on Xarelto  Digoxin level 0.7, continue digoxin and Cardizem p.o.     Hypothyroidism  Continue Synthroid     Hypertension  Continue home blood pressure medications     Type 2 diabetes  A1c of 7.9  Sliding scale insulin     Generalized weakness  PT/OT  Case management consulted for discharge planning     Sacral wound   Wound care consulted      Dispo/Discharge Planning: PT/OT.   Anticipate discharge in 48 hours    Diet:  ADULT DIET Easy to Chew; Low Fat/Low Chol/High Fiber/SUDHA; No Salt Added (3-4 gm); 1500 ml  DVT PPx: Xarelto  Code status: Full Code    Hospital Problems as of 1/8/2022 Date Reviewed: 11/29/2021          Codes Class Noted - Resolved POA    * (Principal) CAP (community acquired pneumonia) ICD-10-CM: J18.9  ICD-9-CM: 382  1/7/2022 - Present Unknown        DM2 (diabetes mellitus, type 2) ([facility] ICD-10-CM: E11.9  ICD-9-CM: 250.00  1/7/2022 - Present Unknown        Weakness generalized ICD-10-CM: R53.1  ICD-9-CM: 780.79  1/7/2022 - Present Unknown        Sacral wound ICD-10-CM: S31.000A  ICD-9-CM: 959.19  1/7/2022 - Present Unknown        Hypertension ICD-10-CM: I10  ICD-9-CM: 401.9  Unknown - Present Yes        Atrial fibrillation with RVR ([facility] ICD-10-CM: I48.91  ICD-9-CM: 427.31  8/19/2011 - Present Unknown        Acquired hypothyroidism ICD-10-CM: E03.9  ICD-9-CM: 244.9  8/19/2011 - Present Unknown              Objective:     Patient Vitals for the past 24 hrs:   Temp Pulse Resp BP SpO2   01/08/22 1117 97.3 °F (36.3 °C) 70 18 (!) 108/54 94 %   01/08/22 0718 97.3 °F (36.3 °C) (!) 111 17 (!) 142/67 90 %   01/08/22 0338 97.4 °F (36.3 °C) (!) 110 18 128/76 90 %   01/07/22 2310 98.2 °F (36.8 °C) (!) 118 20 (!) 140/83 92 %   01/07/22 2030 97.5 °F (36.4 °C) (!) 110 18 128/73 91 %   01/07/22 1705 97.8 °F (36.6 °C) (!) 133 19 124/68 94 %   01/07/22 1525  (!) 111 21 135/65 93 %   01/07/22 1503  (!) 108 24 136/86 95 %   01/07/22 1433  (!) 109 20 131/72 94 %   01/07/22 1404  97 19 (!) 105/59 93 %   01/07/22 1354  (!) 124  (!) 154/85    01/07/22 1351  (!) 114  (!) 154/85    01/07/22 1349  (!) 122  (!) 154/85 94 %     Oxygen Therapy  O2 Sat (%): 94 % (01/08/22 1117)  Pulse via Oximetry: 124 beats per minute (01/07/22 1525)  O2 Device: None (Room air) (01/07/22 2030)  O2 Flow Rate (L/min): 3 l/min (01/07/22 1133)    Estimated body mass index is 26.39 kg/m² as calculated from the following:    Height as of this encounter: 5' 6\" (1.676 m). Weight as of this encounter: 74.2 kg (163 lb 8 oz). Intake/Output Summary (Last 24 hours) at 1/8/2022 1327  Last data filed at 1/8/2022 1117  Gross per 24 hour   Intake 1100 ml   Output 901 ml   Net 199 ml         Physical Exam:     Blood pressure (!) 108/54, pulse 70, temperature 97.3 °F (36.3 °C), resp. rate 18, height 5' 6\" (1.676 m), weight 74.2 kg (163 lb 8 oz), SpO2 94 %. General:    Well nourished. No overt distress  Head:  Normocephalic, atraumatic  Eyes:  Sclerae appear normal.  Pupils equally round. ENT:  Nares appear normal, no drainage. Moist oral mucosa  Neck:  No restricted ROM. Trachea midline   CV:   RRR. No m/r/g. No jugular venous distension. Lungs:   CTAB. No wheezing, rhonchi, or rales. Respirations even, unlabored  Abdomen: Bowel sounds present. Soft, nontender, nondistended. Extremities: No cyanosis or clubbing. No edema  Skin:     No rashes and normal coloration. Warm and dry. Neuro:  CN II-XII grossly intact. Sensation intact. A&Ox3  Psych:  Normal mood and affect.       I have reviewed ordered lab tests and independently visualized imaging below:    Recent Labs:  Recent Results (from the past 48 hour(s))   URINALYSIS W/MICROSCOPIC    Collection Time: 01/06/22  4:08 PM   Result Value Ref Range    Specific Gravity 1.011 1.005 - 1.030    pH (UA) 6.0 5.0 - 7.5    Color Yellow Yellow    Appearance Clear Clear    Leukocyte Esterase Trace (A) Negative    Protein Negative Negative/Trace    Glucose Negative Negative    Ketone Negative Negative    Blood 1+ (A) Negative    Bilirubin Negative Negative    Urobilinogen 0.2 0.2 - 1.0 mg/dL    Nitrites Negative Negative    Microscopic Examination See additional order    MICROSCOPIC EXAMINATION    Collection Time: 01/06/22  4:08 PM   Result Value Ref Range    WBC 0-5 0 - 5 /hpf    RBC None seen 0 - 2 /hpf    Epithelial cells 0-10 0 - 10 /hpf    Casts None seen None seen /lpf    Bacteria None seen None seen/Few   CULTURE, URINE    Collection Time: 01/06/22  4:11 PM    Specimen: Urine   Result Value Ref Range    Urine Culture, Routine       Mixed urogenital regis  10,000-25,000 colony forming units per mL     CBC WITH AUTOMATED DIFF    Collection Time: 01/07/22 11:13 AM   Result Value Ref Range    WBC 13.4 (H) 4.3 - 11.1 K/uL    RBC 4.48 4.05 - 5.2 M/uL    HGB 13.4 11.7 - 15.4 g/dL    HCT 41.5 35.8 - 46.3 %    MCV 92.6 79.6 - 97.8 FL    MCH 29.9 26.1 - 32.9 PG    MCHC 32.3 31.4 - 35.0 g/dL    RDW 14.7 (H) 11.9 - 14.6 %    PLATELET 385 (H) 550 - 450 K/uL    MPV 9.5 9.4 - 12.3 FL    ABSOLUTE NRBC 0.00 0.0 - 0.2 K/uL    DF AUTOMATED      NEUTROPHILS 85 (H) 43 - 78 %    LYMPHOCYTES 7 (L) 13 - 44 %    MONOCYTES 7 4.0 - 12.0 %    EOSINOPHILS 0 (L) 0.5 - 7.8 %    BASOPHILS 0 0.0 - 2.0 %    IMMATURE GRANULOCYTES 1 0.0 - 5.0 %    ABS. NEUTROPHILS 11.4 (H) 1.7 - 8.2 K/UL    ABS. LYMPHOCYTES 1.0 0.5 - 4.6 K/UL    ABS. MONOCYTES 0.9 0.1 - 1.3 K/UL    ABS. EOSINOPHILS 0.0 0.0 - 0.8 K/UL    ABS. BASOPHILS 0.1 0.0 - 0.2 K/UL    ABS. IMM.  GRANS. 0.1 0.0 - 0.5 K/UL   METABOLIC PANEL, COMPREHENSIVE    Collection Time: 01/07/22 11:13 AM   Result Value Ref Range    Sodium 134 (L) 136 - 145 mmol/L    Potassium 4.0 3.5 - 5.1 mmol/L    Chloride 100 98 - 107 mmol/L    CO2 26 21 - 32 mmol/L    Anion gap 8 7 - 16 mmol/L    Glucose 180 (H) 65 - 100 mg/dL    BUN 19 8 - 23 MG/DL    Creatinine 1.07 (H) 0.6 - 1.0 MG/DL    GFR est AA >60 >60 ml/min/1.73m2    GFR est non-AA 50 (L) >60 ml/min/1.73m2    Calcium 9.2 8.3 - 10.4 MG/DL    Bilirubin, total 0.8 0.2 - 1.1 MG/DL    ALT (SGPT) 30 12 - 65 U/L    AST (SGOT) 31 15 - 37 U/L Alk. phosphatase 117 50 - 136 U/L    Protein, total 7.0 6.3 - 8.2 g/dL    Albumin 2.5 (L) 3.2 - 4.6 g/dL    Globulin 4.5 (H) 2.3 - 3.5 g/dL    A-G Ratio 0.6 (L) 1.2 - 3.5     PROCALCITONIN    Collection Time: 01/07/22 11:13 AM   Result Value Ref Range    Procalcitonin <0.05 0.00 - 0.49 ng/mL   DIGOXIN    Collection Time: 01/07/22 11:13 AM   Result Value Ref Range    Digoxin level 0.7 (L) 0.90 - 2.10 NG/ML   EKG, 12 LEAD, INITIAL    Collection Time: 01/07/22 11:42 AM   Result Value Ref Range    Ventricular Rate 122 BPM    Atrial Rate 146 BPM    QRS Duration 103 ms    Q-T Interval 335 ms    QTC Calculation (Bezet) 478 ms    Calculated R Axis 63 degrees    Calculated T Axis -38 degrees    Diagnosis       Atrial fibrillation  Premature ventricular complexes  Non specific ST/T wave changes    Confirmed by Nabil Mcdonnell MD (), MELQUIADES ([id number]) on 1/7/2022 1:52:21 PM     COVID-19 RAPID TEST    Collection Time: 01/07/22 11:57 AM   Result Value Ref Range    Specimen source NASAL      COVID-19 rapid test Not detected NOTD     CULTURE, BLOOD    Collection Time: 01/07/22 11:57 AM    Specimen: Blood   Result Value Ref Range    Special Requests: RIGHT  Antecubital        Culture result: NO GROWTH AFTER 22 HOURS     LACTIC ACID    Collection Time: 01/07/22 11:57 AM   Result Value Ref Range    Lactic acid 2.1 (H) 0.4 - 2.0 MMOL/L   CULTURE, BLOOD    Collection Time: 01/07/22 11:58 AM    Specimen: Blood   Result Value Ref Range    Special Requests: LEFT  ARM        Culture result: NO GROWTH AFTER 22 HOURS     NT-PRO BNP    Collection Time: 01/07/22 11:58 AM   Result Value Ref Range    NT pro-BNP 4,161 (H) <450 PG/ML   LACTIC ACID    Collection Time: 01/07/22  1:49 PM   Result Value Ref Range    Lactic acid 2.0 0.4 - 2.0 MMOL/L   URINALYSIS W/ RFLX MICROSCOPIC    Collection Time: 01/07/22  1:56 PM   Result Value Ref Range    Color YELLOW      Appearance CLEAR      Specific gravity 1.008 1.001 - 1.023      pH (UA) 5.5 5.0 - 9.0 Protein Negative NEG mg/dL    Glucose Negative mg/dL    Ketone Negative NEG mg/dL    Bilirubin Negative NEG      Blood TRACE (A) NEG      Urobilinogen 0.2 0.2 - 1.0 EU/dL    Nitrites Negative NEG      Leukocyte Esterase Negative NEG      Bacteria 0 0 /hpf    Other observations RESULTS VERIFIED MANUALLY     METABOLIC PANEL, COMPREHENSIVE    Collection Time: 01/08/22  5:14 AM   Result Value Ref Range    Sodium 139 136 - 145 mmol/L    Potassium 3.9 3.5 - 5.1 mmol/L    Chloride 105 98 - 107 mmol/L    CO2 26 21 - 32 mmol/L    Anion gap 8 7 - 16 mmol/L    Glucose 150 (H) 65 - 100 mg/dL    BUN 17 8 - 23 MG/DL    Creatinine 0.98 0.6 - 1.0 MG/DL    GFR est AA >60 >60 ml/min/1.73m2    GFR est non-AA 56 (L) >60 ml/min/1.73m2    Calcium 8.4 8.3 - 10.4 MG/DL    Bilirubin, total 0.7 0.2 - 1.1 MG/DL    ALT (SGPT) 25 12 - 65 U/L    AST (SGOT) 29 15 - 37 U/L    Alk. phosphatase 100 50 - 136 U/L    Protein, total 6.5 6.3 - 8.2 g/dL    Albumin 2.3 (L) 3.2 - 4.6 g/dL    Globulin 4.2 (H) 2.3 - 3.5 g/dL    A-G Ratio 0.5 (L) 1.2 - 3.5     MAGNESIUM    Collection Time: 01/08/22  5:14 AM   Result Value Ref Range    Magnesium 2.2 1.8 - 2.4 mg/dL   CBC WITH AUTOMATED DIFF    Collection Time: 01/08/22  5:14 AM   Result Value Ref Range    WBC 12.7 (H) 4.3 - 11.1 K/uL    RBC 4.12 4.05 - 5.2 M/uL    HGB 12.3 11.7 - 15.4 g/dL    HCT 38.3 35.8 - 46.3 %    MCV 93.0 79.6 - 97.8 FL    MCH 29.9 26.1 - 32.9 PG    MCHC 32.1 31.4 - 35.0 g/dL    RDW 14.9 (H) 11.9 - 14.6 %    PLATELET 131 345 - 427 K/uL    MPV 9.4 9.4 - 12.3 FL    ABSOLUTE NRBC 0.00 0.0 - 0.2 K/uL    DF AUTOMATED      NEUTROPHILS 77 43 - 78 %    LYMPHOCYTES 10 (L) 13 - 44 %    MONOCYTES 8 4.0 - 12.0 %    EOSINOPHILS 4 0.5 - 7.8 %    BASOPHILS 1 0.0 - 2.0 %    IMMATURE GRANULOCYTES 0 0.0 - 5.0 %    ABS. NEUTROPHILS 9.7 (H) 1.7 - 8.2 K/UL    ABS. LYMPHOCYTES 1.3 0.5 - 4.6 K/UL    ABS. MONOCYTES 1.0 0.1 - 1.3 K/UL    ABS. EOSINOPHILS 0.5 0.0 - 0.8 K/UL    ABS.  BASOPHILS 0.1 0.0 - 0.2 K/UL ABS. IMM. GRANS. 0.0 0.0 - 0.5 K/UL   PROTHROMBIN TIME + INR    Collection Time: 01/08/22  5:14 AM   Result Value Ref Range    Prothrombin time 17.1 (H) 12.6 - 14.5 sec    INR 1.4     PTT    Collection Time: 01/08/22  5:14 AM   Result Value Ref Range    aPTT 48.4 (H) 24.1 - 35.1 SEC       All Micro Results     Procedure Component Value Units Date/Time    BLOOD CULTURE [[id number]] Collected: 01/07/22 1157    Order Status: Completed Specimen: Blood Updated: 01/08/22 1030     Special Requests: --        RIGHT  Antecubital       Culture result: NO GROWTH AFTER 22 HOURS       BLOOD CULTURE [[id number]] Collected: 01/07/22 1158    Order Status: Completed Specimen: Blood Updated: 01/08/22 1030     Special Requests: --        LEFT  ARM       Culture result: NO GROWTH AFTER 22 HOURS       COVID-19 RAPID TEST [[id number]] Collected: 01/07/22 1157    Order Status: Completed Specimen: Nasopharyngeal Updated: 01/07/22 1231     Specimen source NASAL        COVID-19 rapid test Not detected        Comment:      The specimen is NEGATIVE for SARS-CoV-2, the novel coronavirus associated with COVID-19. A negative result does not rule out COVID-19. This test has been authorized by the FDA under an Emergency Use Authorization (EUA) for use by authorized laboratories. Fact sheet for Healthcare Providers: ConventionUpdate.co.nz  Fact sheet for Patients: ConventionUpdate.co.nz       Methodology: Isothermal Nucleic Acid Amplification               Other Studies:  No results found.     Current Meds:  Current Facility-Administered Medications   Medication Dose Route Frequency    dilTIAZem IR (CARDIZEM) tablet 30 mg  30 mg Oral AC&HS    rivaroxaban (XARELTO) tablet 15 mg  15 mg Oral DAILY WITH BREAKFAST    pantoprazole (PROTONIX) tablet 40 mg  40 mg Oral ACB    metoprolol tartrate (LOPRESSOR) tablet 75 mg  75 mg Oral BID    atorvastatin (LIPITOR) tablet 10 mg  10 mg Oral DAILY    levothyroxine (SYNTHROID) tablet 50 mcg  50 mcg Oral 6am    [Held by provider] furosemide (LASIX) tablet 40 mg  40 mg Oral DAILY    calcium carbonate (TUMS) chewable tablet 600 mg [elemental]  600 mg Oral DAILY WITH LUNCH    digoxin (LANOXIN) tablet 0.0625 mg  0.0625 mg Oral DAILY    sodium chloride (NS) flush 5-40 mL  5-40 mL IntraVENous Q8H    sodium chloride (NS) flush 5-40 mL  5-40 mL IntraVENous PRN    acetaminophen (TYLENOL) tablet 650 mg  650 mg Oral Q6H PRN    Or    acetaminophen (TYLENOL) suppository 650 mg  650 mg Rectal Q6H PRN    polyethylene glycol (MIRALAX) packet 17 g  17 g Oral DAILY PRN    ondansetron (ZOFRAN ODT) tablet 4 mg  4 mg Oral Q8H PRN    Or    ondansetron (ZOFRAN) injection 4 mg  4 mg IntraVENous Q6H PRN    cefepime (MAXIPIME) 2 g in 0.9% sodium chloride (MBP/ADV) 100 mL MBP  2 g IntraVENous Q12H    furosemide (LASIX) injection 40 mg  40 mg IntraVENous DAILY       Signed:  Moiz Tubbs MD    Part of this note may have been written by using a voice dictation software. The note has been proof read but may still contain some grammatical/other typographical errors.

## 2022-01-08 NOTE — PROGRESS NOTES
ACUTE PHYSICAL THERAPY GOALS:  (Developed with and agreed upon by patient and/or caregiver. )  LTG:  (1.)Ms. Mukesh Lenz will move from supine to sit and sit to supine , scoot up and down and roll side to side in bed with SUPERVISION within 7 treatment day(s). (2.)Ms. Mukesh Lenz will transfer from bed to chair and chair to bed with SUPERVISION using the least restrictive device within 7 treatment day(s). (3.)Ms. Mukesh Lenz will ambulate with STAND BY ASSIST for 40+ feet with the least restrictive device within 7 treatment day(s). ________________________________________________________________________________________________        PHYSICAL THERAPY ASSESSMENT: Initial Assessment and PM PT Treatment Day # 1      Adam Narayanan is a 80 y.o. female   PRIMARY DIAGNOSIS: CAP (community acquired pneumonia)  CAP (community acquired pneumonia) [J18.9]       Reason for Referral:    ICD-10: Treatment Diagnosis: Generalized Muscle Weakness (M62.81)  Difficulty in walking, Not elsewhere classified (R26.2)  History of falling (Z91.81)  INPATIENT: Pay[place]ARE / Plan: [place] MED[place]ARE PART A AND B / Product Type: Medicare /     ASSESSMENT:     REHAB RECOMMENDATIONS:   Recommendation to date pending progress:  [address] [place] [address]  Equipment:    None     PRIOR LEVEL OF FUNCTION:  (Prior to Hospitalization) INITIAL/CURRENT LEVEL OF FUNCTION:  (Most Recently Demonstrated)   Bed Mobility:   Independent  Sit to Stand:   Modified Independent  Transfers:   Modified Independent  Gait/Mobility:   Modified Independent Bed Mobility:   Moderate Assistance  Sit to Stand:   Minimal Assistance  Transfers:   Contact Guard Assistance  Gait/Mobility:   Minimal Assistance     ASSESSMENT:  Ms. Mukesh Lenz was supine at arrival low in bed, dtr present and able to provide data. dtr reported fall was not witnessed or remembered by pt and discovered a couple hours later using visible evidence for details about fall.  She required modA to reach for rail to sit EOB, once EOB she needed Matteo to stand and walk and sit. Made comfortable in chair. Pt displays deficits one would predict from a 89yo, however dtr reports pt is fairly IND with household basic ADLs. SUBJECTIVE:   Ms. Reji Vasquez states, \"I dont know. \"    SOCIAL HISTORY/LIVING ENVIRONMENT: pt lives with dtr in multi story house, pt able to use elevator if needed to garage otherwise only uses 1 floor. She is IND with ADLs, uses RW in house, If anywhere other than house dtr uses w/c for pt transport. Pt hx of fall post /R, no recollection of fall, bruising on R back indicates tipping direction. dtr stated that there is staff that assts pt when dtr is gone.   Home Environment: Private residence  One/Two Story Residence: One story  Living Alone: No  Support Systems: Child(fransisca)  OBJECTIVE:     PAIN: VITAL SIGNS: LINES/DRAINS:   Pre Treatment: Pain Screen  Pain Scale 1: Numeric (0 - 10)  Pain Intensity 1: 0  Post Treatment: 0   IV and Purewick  O2 Device: None (Room air)     GROSS EVALUATION:   Within Functional Limits Abnormal/ Functional Abnormal/ Non-Functional (see comments) Not Tested Comments:   AROM [] [x] [] []    PROM [] [] [] []    Strength [] [x] [] []    Balance [] [x] [] []    Posture [] [x] [] []    Sensation [] [] [] []    Coordination [] [x] [] []    Tone [] [] [] []    Edema [] [] [] []    Activity Tolerance [] [x] [] []     [] [] [] []      COGNITION/  PERCEPTION: Intact Impaired   (see comments) Comments:   Orientation [x] []    Vision [x] []    Hearing [x] []    Command Following [x] []    Safety Awareness [x] []     [] []      MOBILITY: I Mod I S SBA CGA Min Mod Max Total  NT x2 Comments:   Bed Mobility    Rolling [] [] [] [] [] [] [x] [] [] [] []    Supine to Sit [] [] [] [] [] [] [x] [] [] [] []    Scooting [] [] [] [] [] [] [x] [] [] [] []    Sit to Supine [] [] [] [] [] [] [] [] [] [x] []    Transfers    Sit to Stand [] [] [] [] [] [x] [] [] [] [] []    Bed to Chair [] [] [] [] [] [x] [] [] [] [] []    Stand to Sit [] [] [] [] [] [x] [] [] [] [] []    I=Independent, Mod I=Modified Independent, S=Supervision, SBA=Standby Assistance, CGA=Contact Guard Assistance,   Min=Minimal Assistance, Mod=Moderate Assistance, Max=Maximal Assistance, Total=Total Assistance, NT=Not Tested  GAIT: I Mod I S SBA CGA Min Mod Max Total  NT x2 Comments:   Level of Assistance [] [] [] [] [] [x] [] [] [] [] []    Distance 20    DME Rolling Walker    Gait Quality Slow shuffle, flexed    Weightbearing Status N/A     I=Independent, Mod I=Modified Independent, S=Supervision, SBA=Standby Assistance, CGA=Contact Guard Assistance,   Min=Minimal Assistance, Mod=Moderate Assistance, Max=Maximal Assistance, Total=Total Assistance, NT=Not Tested    [address] [place] [address] AM-[facility] Form       How much difficulty does the patient currently have. .. Unable A Lot A Little None   1. Turning over in bed (including adjusting bedclothes, sheets and blankets)? [] 1   [x] 2   [] 3   [] 4   2. Sitting down on and standing up from a chair with arms ( e.g., wheelchair, bedside commode, etc.)   [] 1   [] 2   [x] 3   [] 4   3. Moving from lying on back to sitting on the side of the bed? [] 1   [x] 2   [] 3   [] 4   How much help from another person does the patient currently need. .. Total A Lot A Little None   4. Moving to and from a bed to a chair (including a wheelchair)? [] 1   [] 2   [x] 3   [] 4   5. Need to walk in hospital room? [] 1   [] 2   [x] 3   [] 4   6. Climbing 3-5 steps with a railing? [] 1   [x] 2   [] 3   [] 4   © 2007, Trustees of [address] [place] [address], under license to [employer]. All rights reserved     Score:  Initial: 15 Most Recent: X (Date: -- )    Interpretation of Tool:  Represents activities that are increasingly more difficult (i.e. Bed mobility, Transfers, Gait).     PLAN:   FREQUENCY/DURATION: PT Plan of Care: 3 times/week for duration of hospital stay or until stated goals are met, whichever comes first.    PROBLEM LIST:   (Skilled intervention is medically necessary to address:)  1. Decreased ADL/Functional Activities  2. Decreased Activity Tolerance  3. Decreased AROM/PROM  4. Decreased Balance  5. Decreased Cognition  6. Decreased Coordination  7. Decreased Gait Ability  8. Decreased Strength  9. Decreased Transfer Abilities   INTERVENTIONS PLANNED:   (Benefits and precautions of physical therapy have been discussed with the patient.)  1. Therapeutic Activity  2. Therapeutic Exercise/HEP  3. Neuromuscular Re-education  4. Gait Training  5. Education     TREATMENT:     EVALUATION: Moderate Complexity : (Untimed Charge)    TREATMENT:   ($$ Therapeutic Activity: 8-22 mins    )  Therapeutic Activity (10 Minutes): Therapeutic activity included Rolling, Supine to Sit, Scooting, Lateral Scooting, Transfer Training, Ambulation on level ground, Sitting balance  and Standing balance to improve functional Mobility, Strength and Activity tolerance.     TREATMENT GRID:  N/A    AFTER TREATMENT POSITION/PRECAUTIONS:  Chair, Needs within reach, RN notified and Visitors at bedside    INTERDISCIPLINARY COLLABORATION:  RN/PCT and PT/PTA    TOTAL TREATMENT DURATION:  PT Patient Time In/Time Out  Time In: 1515  Time Out: 1317 Maty Astorga DPT

## 2022-01-08 NOTE — PROGRESS NOTES
Problem: Pressure Injury - Risk of  Goal: *Prevention of pressure injury  Description: Document Brennan Scale and appropriate interventions in the flowsheet. Outcome: Progressing Towards Goal  Note: Pressure Injury Interventions:  Sensory Interventions: Assess changes in LOC    Moisture Interventions: Absorbent underpads    Activity Interventions: Increase time out of bed    Mobility Interventions: HOB 30 degrees or less    Nutrition Interventions: Document food/fluid/supplement intake    Friction and Shear Interventions: HOB 30 degrees or less                Problem: Patient Education: Go to Patient Education Activity  Goal: Patient/Family Education  Outcome: Progressing Towards Goal     Problem: Falls - Risk of  Goal: *Absence of Falls  Description: Document Chiki Fall Risk and appropriate interventions in the flowsheet.   Outcome: Progressing Towards Goal  Note: Fall Risk Interventions:  Mobility Interventions: Bed/chair exit alarm    Mentation Interventions: Adequate sleep, hydration, pain control    Medication Interventions: Bed/chair exit alarm    Elimination Interventions: Bed/chair exit alarm    History of Falls Interventions: Bed/chair exit alarm         Problem: Patient Education: Go to Patient Education Activity  Goal: Patient/Family Education  Outcome: Progressing Towards Goal

## 2022-01-09 ENCOUNTER — APPOINTMENT (OUTPATIENT)
Dept: NON INVASIVE DIAGNOSTICS | Age: 87
DRG: 178 | End: 2022-01-09
Attending: FAMILY MEDICINE
Payer: MEDICARE

## 2022-01-09 ENCOUNTER — APPOINTMENT (OUTPATIENT)
Dept: GENERAL RADIOLOGY | Age: 87
DRG: 178 | End: 2022-01-09
Attending: FAMILY MEDICINE
Payer: MEDICARE

## 2022-01-09 LAB
ALBUMIN SERPL-MCNC: 2.2 G/DL (ref 3.2–4.6)
ALBUMIN/GLOB SERPL: 0.6 {RATIO} (ref 1.2–3.5)
ALP SERPL-CCNC: 91 U/L (ref 50–136)
ALT SERPL-CCNC: 20 U/L (ref 12–65)
ANION GAP SERPL CALC-SCNC: 7 MMOL/L (ref 7–16)
AST SERPL-CCNC: 27 U/L (ref 15–37)
BASOPHILS # BLD: 0.1 K/UL (ref 0–0.2)
BASOPHILS NFR BLD: 1 % (ref 0–2)
BILIRUB SERPL-MCNC: 0.6 MG/DL (ref 0.2–1.1)
BUN SERPL-MCNC: 17 MG/DL (ref 8–23)
CALCIUM SERPL-MCNC: 8.3 MG/DL (ref 8.3–10.4)
CHLORIDE SERPL-SCNC: 102 MMOL/L (ref 98–107)
CO2 SERPL-SCNC: 28 MMOL/L (ref 21–32)
CREAT SERPL-MCNC: 1.18 MG/DL (ref 0.6–1)
DIFFERENTIAL METHOD BLD: ABNORMAL
ECHO AO ASC DIAM: 2.9 CM
ECHO AO ASCENDING AORTA INDEX: 1.58 CM/M2
ECHO AO ROOT DIAM: 2.9 CM
ECHO AO ROOT INDEX: 1.58 CM/M2
ECHO AR MAX VEL PISA: 3.8 M/S
ECHO AV AREA PEAK VELOCITY: 1.2 CM2
ECHO AV AREA PEAK VELOCITY: 1.2 CM2
ECHO AV AREA VTI: 1.5 CM2
ECHO AV AREA VTI: 1.5 CM2
ECHO AV MEAN GRADIENT: 4 MMHG
ECHO AV MEAN VELOCITY: 1 M/S
ECHO AV PEAK GRADIENT: 9 MMHG
ECHO AV PEAK VELOCITY: 1.5 M/S
ECHO AV REGURGITANT PHT: 306.5 MILLISECOND
ECHO AV VELOCITY RATIO: 0.47
ECHO AV VTI: 22.5 CM
ECHO EST RA PRESSURE: 3 MMHG
ECHO LA DIAMETER INDEX: 2.68 CM/M2
ECHO LA DIAMETER: 4.9 CM
ECHO LA TO AORTIC ROOT RATIO: 1.69
ECHO LA VOL 2C: 99 ML (ref 22–52)
ECHO LA VOL 4C: 95 ML (ref 22–52)
ECHO LA VOLUME INDEX A2C: 54 ML/M2 (ref 16–34)
ECHO LA VOLUME INDEX A4C: 52 ML/M2 (ref 16–34)
ECHO LV EDV A2C: 45 ML
ECHO LV EDV A4C: 39 ML
ECHO LV EDV BP: 41 ML (ref 56–104)
ECHO LV EDV INDEX A4C: 21 ML/M2
ECHO LV EDV INDEX BP: 22 ML/M2
ECHO LV EDV INDEX TEICHHOLZ: 50 ML/M2
ECHO LV EDV NDEX A2C: 25 ML/M2
ECHO LV EDV TEICHHOLZ: 91 ML
ECHO LV EJECTION FRACTION A2C: 67 %
ECHO LV EJECTION FRACTION A4C: 68 %
ECHO LV EJECTION FRACTION BIPLANE: 67 % (ref 55–100)
ECHO LV ESV A2C: 15 ML
ECHO LV ESV A4C: 12 ML
ECHO LV ESV BP: 14 ML (ref 19–49)
ECHO LV ESV INDEX A2C: 8 ML/M2
ECHO LV ESV INDEX A4C: 7 ML/M2
ECHO LV ESV INDEX BP: 8 ML/M2
ECHO LV ESV INDEX TEICHHOLZ: 22 ML/M2
ECHO LV ESV TEICHHOLZ: 40 ML
ECHO LV FRACTIONAL SHORTENING: 29 % (ref 28–44)
ECHO LV INTERNAL DIMENSION DIASTOLE INDEX: 2.46 CM/M2
ECHO LV INTERNAL DIMENSION DIASTOLIC: 4.5 CM (ref 3.9–5.3)
ECHO LV INTERNAL DIMENSION SYSTOLIC INDEX: 1.75 CM/M2
ECHO LV INTERNAL DIMENSION SYSTOLIC: 3.2 CM
ECHO LV IVSD: 1.3 CM (ref 0.6–0.9)
ECHO LV MASS 2D: 210.2 G (ref 67–162)
ECHO LV MASS INDEX 2D: 114.8 G/M2 (ref 43–95)
ECHO LV POSTERIOR WALL DIASTOLIC: 1.2 CM (ref 0.6–0.9)
ECHO LV RELATIVE WALL THICKNESS RATIO: 0.53
ECHO LVOT AREA: 2.5 CM2
ECHO LVOT AV VTI INDEX: 0.61
ECHO LVOT DIAM: 1.8 CM
ECHO LVOT MEAN GRADIENT: 1 MMHG
ECHO LVOT PEAK GRADIENT: 2 MMHG
ECHO LVOT PEAK VELOCITY: 0.7 M/S
ECHO LVOT STROKE VOLUME INDEX: 19.2 ML/M2
ECHO LVOT SV: 35.1 ML
ECHO LVOT VTI: 13.8 CM
ECHO MV REGURGITANT ALIASING (NYQUIST) VELOCITY: 34 CM/S
ECHO MV REGURGITANT VELOCITY PISA: 4.4 M/S
ECHO MV REGURGITANT VTIA: 115.6 CM
ECHO RIGHT VENTRICULAR SYSTOLIC PRESSURE (RVSP): 40 MMHG
ECHO RV INTERNAL DIMENSION: 3.1 CM
ECHO RV TAPSE: 1.4 CM (ref 1.5–2)
ECHO TV REGURGITANT MAX VELOCITY: 3.05 M/S
ECHO TV REGURGITANT PEAK GRADIENT: 45 MMHG
EOSINOPHIL # BLD: 1.1 K/UL (ref 0–0.8)
EOSINOPHIL NFR BLD: 10 % (ref 0.5–7.8)
ERYTHROCYTE [DISTWIDTH] IN BLOOD BY AUTOMATED COUNT: 15.4 % (ref 11.9–14.6)
GLOBULIN SER CALC-MCNC: 3.8 G/DL (ref 2.3–3.5)
GLUCOSE SERPL-MCNC: 185 MG/DL (ref 65–100)
HCT VFR BLD AUTO: 36.5 % (ref 35.8–46.3)
HGB BLD-MCNC: 11.5 G/DL (ref 11.7–15.4)
IMM GRANULOCYTES # BLD AUTO: 0.1 K/UL (ref 0–0.5)
IMM GRANULOCYTES NFR BLD AUTO: 1 % (ref 0–5)
LYMPHOCYTES # BLD: 1 K/UL (ref 0.5–4.6)
LYMPHOCYTES NFR BLD: 9 % (ref 13–44)
MCH RBC QN AUTO: 29.9 PG (ref 26.1–32.9)
MCHC RBC AUTO-ENTMCNC: 31.5 G/DL (ref 31.4–35)
MCV RBC AUTO: 95.1 FL (ref 79.6–97.8)
MONOCYTES # BLD: 0.9 K/UL (ref 0.1–1.3)
MONOCYTES NFR BLD: 8 % (ref 4–12)
NEUTS SEG # BLD: 7.8 K/UL (ref 1.7–8.2)
NEUTS SEG NFR BLD: 72 % (ref 43–78)
NRBC # BLD: 0 K/UL (ref 0–0.2)
PLATELET # BLD AUTO: 404 K/UL (ref 150–450)
PMV BLD AUTO: 9.6 FL (ref 9.4–12.3)
POTASSIUM SERPL-SCNC: 3.7 MMOL/L (ref 3.5–5.1)
PROT SERPL-MCNC: 6 G/DL (ref 6.3–8.2)
RBC # BLD AUTO: 3.84 M/UL (ref 4.05–5.2)
SARS-COV-2, COV2: NORMAL
SODIUM SERPL-SCNC: 137 MMOL/L (ref 136–145)
WBC # BLD AUTO: 10.9 K/UL (ref 4.3–11.1)

## 2022-01-09 PROCEDURE — 74011250637 HC RX REV CODE- 250/637: Performed by: FAMILY MEDICINE

## 2022-01-09 PROCEDURE — 65270000029 HC RM PRIVATE

## 2022-01-09 PROCEDURE — 71045 X-RAY EXAM CHEST 1 VIEW: CPT

## 2022-01-09 PROCEDURE — 85025 COMPLETE CBC W/AUTO DIFF WBC: CPT

## 2022-01-09 PROCEDURE — 92610 EVALUATE SWALLOWING FUNCTION: CPT

## 2022-01-09 PROCEDURE — 97165 OT EVAL LOW COMPLEX 30 MIN: CPT

## 2022-01-09 PROCEDURE — U0005 INFEC AGEN DETEC AMPLI PROBE: HCPCS

## 2022-01-09 PROCEDURE — 86580 TB INTRADERMAL TEST: CPT | Performed by: FAMILY MEDICINE

## 2022-01-09 PROCEDURE — 36415 COLL VENOUS BLD VENIPUNCTURE: CPT

## 2022-01-09 PROCEDURE — 74011000250 HC RX REV CODE- 250: Performed by: FAMILY MEDICINE

## 2022-01-09 PROCEDURE — 80053 COMPREHEN METABOLIC PANEL: CPT

## 2022-01-09 PROCEDURE — 97535 SELF CARE MNGMENT TRAINING: CPT

## 2022-01-09 PROCEDURE — 74011250637 HC RX REV CODE- 250/637: Performed by: STUDENT IN AN ORGANIZED HEALTH CARE EDUCATION/TRAINING PROGRAM

## 2022-01-09 PROCEDURE — 74011250636 HC RX REV CODE- 250/636: Performed by: STUDENT IN AN ORGANIZED HEALTH CARE EDUCATION/TRAINING PROGRAM

## 2022-01-09 PROCEDURE — 93306 TTE W/DOPPLER COMPLETE: CPT

## 2022-01-09 PROCEDURE — 74011000258 HC RX REV CODE- 258: Performed by: STUDENT IN AN ORGANIZED HEALTH CARE EDUCATION/TRAINING PROGRAM

## 2022-01-09 PROCEDURE — 74011000250 HC RX REV CODE- 250: Performed by: STUDENT IN AN ORGANIZED HEALTH CARE EDUCATION/TRAINING PROGRAM

## 2022-01-09 PROCEDURE — 77030038269 HC DRN EXT URIN PURWCK BARD -A

## 2022-01-09 RX ORDER — DOXYCYCLINE 100 MG/1
100 CAPSULE ORAL EVERY 12 HOURS
Status: DISCONTINUED | OUTPATIENT
Start: 2022-01-09 | End: 2022-01-11 | Stop reason: HOSPADM

## 2022-01-09 RX ORDER — FUROSEMIDE 40 MG/1
40 TABLET ORAL DAILY
Status: DISCONTINUED | OUTPATIENT
Start: 2022-01-10 | End: 2022-01-09

## 2022-01-09 RX ORDER — FUROSEMIDE 10 MG/ML
40 INJECTION INTRAMUSCULAR; INTRAVENOUS DAILY
Status: DISCONTINUED | OUTPATIENT
Start: 2022-01-10 | End: 2022-01-11 | Stop reason: HOSPADM

## 2022-01-09 RX ORDER — METOPROLOL TARTRATE 50 MG/1
100 TABLET ORAL 2 TIMES DAILY
Status: DISCONTINUED | OUTPATIENT
Start: 2022-01-09 | End: 2022-01-11 | Stop reason: HOSPADM

## 2022-01-09 RX ADMIN — CEFEPIME HYDROCHLORIDE 2 G: 2 INJECTION, POWDER, FOR SOLUTION INTRAVENOUS at 12:21

## 2022-01-09 RX ADMIN — METOPROLOL TARTRATE 100 MG: 50 TABLET, FILM COATED ORAL at 17:10

## 2022-01-09 RX ADMIN — LEVOTHYROXINE SODIUM 50 MCG: 0.05 TABLET ORAL at 06:11

## 2022-01-09 RX ADMIN — RIVAROXABAN 15 MG: 15 TABLET, FILM COATED ORAL at 08:56

## 2022-01-09 RX ADMIN — DOXYCYCLINE HYCLATE 100 MG: 100 CAPSULE ORAL at 16:52

## 2022-01-09 RX ADMIN — SODIUM CHLORIDE, PRESERVATIVE FREE 10 ML: 5 INJECTION INTRAVENOUS at 16:52

## 2022-01-09 RX ADMIN — ATORVASTATIN CALCIUM 10 MG: 10 TABLET, FILM COATED ORAL at 08:56

## 2022-01-09 RX ADMIN — TUBERCULIN PURIFIED PROTEIN DERIVATIVE 5 UNITS: 5 INJECTION, SOLUTION INTRADERMAL at 16:52

## 2022-01-09 RX ADMIN — CEFEPIME HYDROCHLORIDE 2 G: 2 INJECTION, POWDER, FOR SOLUTION INTRAVENOUS at 01:50

## 2022-01-09 RX ADMIN — DILTIAZEM HYDROCHLORIDE 30 MG: 30 TABLET, FILM COATED ORAL at 11:59

## 2022-01-09 RX ADMIN — CALCIUM CARBONATE (ANTACID) CHEW TAB 500 MG 600 MG: 500 CHEW TAB at 11:59

## 2022-01-09 RX ADMIN — DILTIAZEM HYDROCHLORIDE 30 MG: 30 TABLET, FILM COATED ORAL at 06:11

## 2022-01-09 RX ADMIN — FUROSEMIDE 40 MG: 10 INJECTION, SOLUTION INTRAMUSCULAR; INTRAVENOUS at 08:56

## 2022-01-09 RX ADMIN — METOPROLOL TARTRATE 75 MG: 50 TABLET, FILM COATED ORAL at 08:56

## 2022-01-09 RX ADMIN — PANTOPRAZOLE SODIUM 40 MG: 40 TABLET, DELAYED RELEASE ORAL at 06:11

## 2022-01-09 RX ADMIN — DIGOXIN 0.06 MG: 125 TABLET ORAL at 08:56

## 2022-01-09 RX ADMIN — SODIUM CHLORIDE, PRESERVATIVE FREE 10 ML: 5 INJECTION INTRAVENOUS at 06:11

## 2022-01-09 NOTE — PROGRESS NOTES
Hospitalist Progress Note   Admit Date:  2022 10:48 AM   Name:  Tika Archibald   Age:  80 y.o. Sex:  female  :  3/11/1923   MRN:  [id number]   Room:  [facility]    Presenting Complaint: Lethargy    Reason(s) for Admission: CAP (community acquired pneumonia) [J18.9]     Hospital Course & Interval History:   Tika Archibald is a 80 y.o. female with medical history of atrial fibrillation on Xarelto, hypothyroidism, CHF, hypertension, pacemaker who presented with confusion, generalized weaknes and shortness of breath. CT head/spine negative for any acute abnormalities. Chest x-ray showed increasing bilateral infiltrates. CT showed bilateral pulmonary infiltrates and effusion. Patient admitted with possible community-acquired pneumonia. Patient also noted with A. fib with RVR and had been given one-time 10 mg IV dose of Cardizem in the ED. Subjective (22): Patient is seen at the bedside. Reports she is feeling better. Denies chest pain, palpitation, nausea, vomiting or abdominal pain. Discussed CODE STATUS and patient and daughter opted for DNR. Daughter is POA. CODE STATUS changed to DNR. Assessment & Plan:     Community-acquired pneumonia  Chest x-ray with bilateral infiltrates  Patient presented with hypoxia now currently on 3 L at 93%  Continue supplemental oxygen to maintain oxygen saturation greater than 90%  Continue cefepime  UA negative  --SLP eval to rule out aspiration  : Repeat chest x-ray today. Weaned to room air. Continue cefepime and doxycycline.     Congestive heart failure  BNP elevated greater than 4000  Patient given one-time dose Lasix in ED  Continue Lasix daily IV  : Continue Lasix.   Check echocardiogram.    Atrial fibrillation with RVR  Patient currently on digoxin but takes half of the 12.5 mg dose since levels were elevated last time she saw her PCP  Has been given one-time 10 mg IV dose of Cardizem  Cardiology consulted  Patient has been compliant on Xarelto  Digoxin level 0.7, continue digoxin and Cardizem p.o.  1/9: Heart rate better controlled. Increase metoprolol 100 mg twice daily. Pending cardio consult. Echocardiogram ordered     Hypothyroidism  Continue Synthroid     Hypertension  Continue home blood pressure medications     Type 2 diabetes  A1c of 7.9  Sliding scale insulin     Generalized weakness  PT/OT  Case management consulted for discharge planning     Sacral wound   Wound care consulted      Dispo/Discharge Planning: PT/OT. Anticipate discharge in 48 hours. PT/OT recommend short-term rehab. Case management consulted.   PPD ordered    Diet:  ADULT DIET Regular; Low Fat/Low Chol/High Fiber/SUDHA; No Salt Added (3-4 gm); 1500 ml  DVT PPx: Xarelto  Code status: DNR    Hospital Problems as of 1/9/2022 Date Reviewed: 11/29/2021          Codes Class Noted - Resolved POA    * (Principal) CAP (community acquired pneumonia) ICD-10-CM: J18.9  ICD-9-CM: 178  1/7/2022 - Present Unknown        DM2 (diabetes mellitus, type 2) ([facility] ICD-10-CM: E11.9  ICD-9-CM: 250.00  1/7/2022 - Present Unknown        Weakness generalized ICD-10-CM: R53.1  ICD-9-CM: 780.79  1/7/2022 - Present Unknown        Sacral wound ICD-10-CM: S31.000A  ICD-9-CM: 959.19  1/7/2022 - Present Unknown        Hypertension ICD-10-CM: I10  ICD-9-CM: 401.9  Unknown - Present Yes        Atrial fibrillation with RVR ([facility] ICD-10-CM: I48.91  ICD-9-CM: 427.31  8/19/2011 - Present Unknown        Acquired hypothyroidism ICD-10-CM: E03.9  ICD-9-CM: 244.9  8/19/2011 - Present Unknown              Objective:     Patient Vitals for the past 24 hrs:   Temp Pulse Resp BP SpO2   01/09/22 1143 98.3 °F (36.8 °C) 62 18 94/60 93 %   01/09/22 0732 97.9 °F (36.6 °C) (!) 103 16 116/70 90 %   01/09/22 0356 98.7 °F (37.1 °C) (!) 101 18 130/60 91 %   01/09/22 0019 98.3 °F (36.8 °C) (!) 104 18 102/74 92 %   01/08/22 1959 98.4 °F (36.9 °C) 69 18 (!) 96/56 90 %   01/08/22 1617 98.3 °F (36.8 °C) (!) 109 17 110/67 93 %     Oxygen Therapy  O2 Sat (%): 93 % (01/09/22 1143)  Pulse via Oximetry: 124 beats per minute (01/07/22 1525)  O2 Device: None (Room air) (01/09/22 0356)  O2 Flow Rate (L/min): 3 l/min (01/07/22 1133)    Estimated body mass index is 26.39 kg/m² as calculated from the following:    Height as of this encounter: 5' 6\" (1.676 m). Weight as of this encounter: 74.2 kg (163 lb 8 oz). No intake or output data in the 24 hours ending 01/09/22 1238      Physical Exam:     Blood pressure 94/60, pulse 62, temperature 98.3 °F (36.8 °C), resp. rate 18, height 5' 6\" (1.676 m), weight 74.2 kg (163 lb 8 oz), SpO2 93 %. General:    Well nourished. No overt distress  Head:  Normocephalic, atraumatic  Eyes:  Sclerae appear normal.  Pupils equally round. ENT:  Nares appear normal, no drainage. Moist oral mucosa  Neck:  No restricted ROM. Trachea midline   CV:   RRR. No m/r/g. No jugular venous distension. Lungs:   CTAB. No wheezing, rhonchi, or rales. Respirations even, unlabored  Abdomen: Bowel sounds present. Soft, nontender, nondistended. Extremities: No cyanosis or clubbing. No edema  Skin:     No rashes and normal coloration. Warm and dry. Neuro:  CN II-XII grossly intact. Sensation intact. A&Ox3  Psych:  Normal mood and affect.       I have reviewed ordered lab tests and independently visualized imaging below:    Recent Labs:  Recent Results (from the past 48 hour(s))   LACTIC ACID    Collection Time: 01/07/22  1:49 PM   Result Value Ref Range    Lactic acid 2.0 0.4 - 2.0 MMOL/L   URINALYSIS W/ RFLX MICROSCOPIC    Collection Time: 01/07/22  1:56 PM   Result Value Ref Range    Color YELLOW      Appearance CLEAR      Specific gravity 1.008 1.001 - 1.023      pH (UA) 5.5 5.0 - 9.0      Protein Negative NEG mg/dL    Glucose Negative mg/dL    Ketone Negative NEG mg/dL    Bilirubin Negative NEG      Blood TRACE (A) NEG      Urobilinogen 0.2 0.2 - 1.0 EU/dL    Nitrites Negative NEG Leukocyte Esterase Negative NEG      Bacteria 0 0 /hpf    Other observations RESULTS VERIFIED MANUALLY     METABOLIC PANEL, COMPREHENSIVE    Collection Time: 01/08/22  5:14 AM   Result Value Ref Range    Sodium 139 136 - 145 mmol/L    Potassium 3.9 3.5 - 5.1 mmol/L    Chloride 105 98 - 107 mmol/L    CO2 26 21 - 32 mmol/L    Anion gap 8 7 - 16 mmol/L    Glucose 150 (H) 65 - 100 mg/dL    BUN 17 8 - 23 MG/DL    Creatinine 0.98 0.6 - 1.0 MG/DL    GFR est AA >60 >60 ml/min/1.73m2    GFR est non-AA 56 (L) >60 ml/min/1.73m2    Calcium 8.4 8.3 - 10.4 MG/DL    Bilirubin, total 0.7 0.2 - 1.1 MG/DL    ALT (SGPT) 25 12 - 65 U/L    AST (SGOT) 29 15 - 37 U/L    Alk. phosphatase 100 50 - 136 U/L    Protein, total 6.5 6.3 - 8.2 g/dL    Albumin 2.3 (L) 3.2 - 4.6 g/dL    Globulin 4.2 (H) 2.3 - 3.5 g/dL    A-G Ratio 0.5 (L) 1.2 - 3.5     MAGNESIUM    Collection Time: 01/08/22  5:14 AM   Result Value Ref Range    Magnesium 2.2 1.8 - 2.4 mg/dL   CBC WITH AUTOMATED DIFF    Collection Time: 01/08/22  5:14 AM   Result Value Ref Range    WBC 12.7 (H) 4.3 - 11.1 K/uL    RBC 4.12 4.05 - 5.2 M/uL    HGB 12.3 11.7 - 15.4 g/dL    HCT 38.3 35.8 - 46.3 %    MCV 93.0 79.6 - 97.8 FL    MCH 29.9 26.1 - 32.9 PG    MCHC 32.1 31.4 - 35.0 g/dL    RDW 14.9 (H) 11.9 - 14.6 %    PLATELET 213 179 - 472 K/uL    MPV 9.4 9.4 - 12.3 FL    ABSOLUTE NRBC 0.00 0.0 - 0.2 K/uL    DF AUTOMATED      NEUTROPHILS 77 43 - 78 %    LYMPHOCYTES 10 (L) 13 - 44 %    MONOCYTES 8 4.0 - 12.0 %    EOSINOPHILS 4 0.5 - 7.8 %    BASOPHILS 1 0.0 - 2.0 %    IMMATURE GRANULOCYTES 0 0.0 - 5.0 %    ABS. NEUTROPHILS 9.7 (H) 1.7 - 8.2 K/UL    ABS. LYMPHOCYTES 1.3 0.5 - 4.6 K/UL    ABS. MONOCYTES 1.0 0.1 - 1.3 K/UL    ABS. EOSINOPHILS 0.5 0.0 - 0.8 K/UL    ABS. BASOPHILS 0.1 0.0 - 0.2 K/UL    ABS. IMM.  GRANS. 0.0 0.0 - 0.5 K/UL   PROTHROMBIN TIME + INR    Collection Time: 01/08/22  5:14 AM   Result Value Ref Range    Prothrombin time 17.1 (H) 12.6 - 14.5 sec    INR 1.4     PTT    Collection Time: 01/08/22  5:14 AM   Result Value Ref Range    aPTT 48.4 (H) 24.1 - 35.1 SEC   CBC WITH AUTOMATED DIFF    Collection Time: 01/09/22  9:58 AM   Result Value Ref Range    WBC 10.9 4.3 - 11.1 K/uL    RBC 3.84 (L) 4.05 - 5.2 M/uL    HGB 11.5 (L) 11.7 - 15.4 g/dL    HCT 36.5 35.8 - 46.3 %    MCV 95.1 79.6 - 97.8 FL    MCH 29.9 26.1 - 32.9 PG    MCHC 31.5 31.4 - 35.0 g/dL    RDW 15.4 (H) 11.9 - 14.6 %    PLATELET 617 223 - 846 K/uL    MPV 9.6 9.4 - 12.3 FL    ABSOLUTE NRBC 0.00 0.0 - 0.2 K/uL    DF AUTOMATED      NEUTROPHILS 72 43 - 78 %    LYMPHOCYTES 9 (L) 13 - 44 %    MONOCYTES 8 4.0 - 12.0 %    EOSINOPHILS 10 (H) 0.5 - 7.8 %    BASOPHILS 1 0.0 - 2.0 %    IMMATURE GRANULOCYTES 1 0.0 - 5.0 %    ABS. NEUTROPHILS 7.8 1.7 - 8.2 K/UL    ABS. LYMPHOCYTES 1.0 0.5 - 4.6 K/UL    ABS. MONOCYTES 0.9 0.1 - 1.3 K/UL    ABS. EOSINOPHILS 1.1 (H) 0.0 - 0.8 K/UL    ABS. BASOPHILS 0.1 0.0 - 0.2 K/UL    ABS. IMM. GRANS. 0.1 0.0 - 0.5 K/UL   METABOLIC PANEL, COMPREHENSIVE    Collection Time: 01/09/22  9:58 AM   Result Value Ref Range    Sodium 137 136 - 145 mmol/L    Potassium 3.7 3.5 - 5.1 mmol/L    Chloride 102 98 - 107 mmol/L    CO2 28 21 - 32 mmol/L    Anion gap 7 7 - 16 mmol/L    Glucose 185 (H) 65 - 100 mg/dL    BUN 17 8 - 23 MG/DL    Creatinine 1.18 (H) 0.6 - 1.0 MG/DL    GFR est AA 54 (L) >60 ml/min/1.73m2    GFR est non-AA 45 (L) >60 ml/min/1.73m2    Calcium 8.3 8.3 - 10.4 MG/DL    Bilirubin, total 0.6 0.2 - 1.1 MG/DL    ALT (SGPT) 20 12 - 65 U/L    AST (SGOT) 27 15 - 37 U/L    Alk.  phosphatase 91 50 - 136 U/L    Protein, total 6.0 (L) 6.3 - 8.2 g/dL    Albumin 2.2 (L) 3.2 - 4.6 g/dL    Globulin 3.8 (H) 2.3 - 3.5 g/dL    A-G Ratio 0.6 (L) 1.2 - 3.5         All Micro Results     Procedure Component Value Units Date/Time    BLOOD CULTURE [[id number]] Collected: 01/07/22 1157    Order Status: Completed Specimen: Blood Updated: 01/09/22 1112     Special Requests: --        RIGHT  Antecubital       Culture result: NO GROWTH 2 DAYS BLOOD CULTURE [[id number]] Collected: 01/07/22 1158    Order Status: Completed Specimen: Blood Updated: 01/09/22 1112     Special Requests: --        LEFT  ARM       Culture result: NO GROWTH 2 DAYS       COVID-19 RAPID TEST [[id number]] Collected: 01/07/22 1157    Order Status: Completed Specimen: Nasopharyngeal Updated: 01/07/22 1231     Specimen source NASAL        COVID-19 rapid test Not detected        Comment:      The specimen is NEGATIVE for SARS-CoV-2, the novel coronavirus associated with COVID-19. A negative result does not rule out COVID-19. This test has been authorized by the FDA under an Emergency Use Authorization (EUA) for use by authorized laboratories. Fact sheet for Healthcare Providers: ConventionUpdate.co.nz  Fact sheet for Patients: ConventionGamadordate.co.nz       Methodology: Isothermal Nucleic Acid Amplification               Other Studies:  XR CHEST SNGL V    Result Date: 1/9/2022  Portable chest x-ray CLINICAL INDICATION: Pneumonia, follow-up effusions FINDINGS: Single AP view the chest compared to a similar exam dated 1/7/2022 shows stable diffuse bilateral reticular nodular interstitial densities throughout both lungs. No airspace consolidation evident. The cardiac silhouette and mediastinum are unremarkable. A pacer device is in place. No significant interval change in the bilateral atypical pneumonia pattern.       Current Meds:  Current Facility-Administered Medications   Medication Dose Route Frequency    metoprolol tartrate (LOPRESSOR) tablet 100 mg  100 mg Oral BID    [START ON 1/10/2022] furosemide (LASIX) injection 40 mg  40 mg IntraVENous DAILY    dilTIAZem IR (CARDIZEM) tablet 30 mg  30 mg Oral AC&HS    cyclobenzaprine (FLEXERIL) tablet 5 mg  5 mg Oral TID PRN    rivaroxaban (XARELTO) tablet 15 mg  15 mg Oral DAILY WITH BREAKFAST    pantoprazole (PROTONIX) tablet 40 mg  40 mg Oral ACB    atorvastatin (LIPITOR) tablet 10 mg  10 mg Oral DAILY    levothyroxine (SYNTHROID) tablet 50 mcg  50 mcg Oral 6am    calcium carbonate (TUMS) chewable tablet 600 mg [elemental]  600 mg Oral DAILY WITH LUNCH    digoxin (LANOXIN) tablet 0.0625 mg  0.0625 mg Oral DAILY    sodium chloride (NS) flush 5-40 mL  5-40 mL IntraVENous Q8H    sodium chloride (NS) flush 5-40 mL  5-40 mL IntraVENous PRN    acetaminophen (TYLENOL) tablet 650 mg  650 mg Oral Q6H PRN    Or    acetaminophen (TYLENOL) suppository 650 mg  650 mg Rectal Q6H PRN    polyethylene glycol (MIRALAX) packet 17 g  17 g Oral DAILY PRN    ondansetron (ZOFRAN ODT) tablet 4 mg  4 mg Oral Q8H PRN    Or    ondansetron (ZOFRAN) injection 4 mg  4 mg IntraVENous Q6H PRN    cefepime (MAXIPIME) 2 g in 0.9% sodium chloride (MBP/ADV) 100 mL MBP  2 g IntraVENous Q12H       Signed:  Hillary Seymour MD    Part of this note may have been written by using a voice dictation software. The note has been proof read but may still contain some grammatical/other typographical errors.

## 2022-01-09 NOTE — PROGRESS NOTES
CM consult received to assist with rehab placement. SW met with pt's dtr, Melanie Balderas, at the bedside. She confirmed that she would like the pt to go to rehab with the possibility of her needing LTC. From a list of rehab facilities she requested referrals to [facility]. Referrals submitted. Awaiting responses. Dtr confirmed pt is vaccinated and has received her booster. PPD and regular COVID text ordered today. CM to continue to follow to facilitate pt's transfer to rehab at [place]. Care Management Interventions  PCP Verified by CM: Yes (Dr Jania Oakley last visit November 2021)  Mode of Transport at Discharge: BLS  Transition of Care Consult (CM Consult): Discharge Planning,SNF  MyChart Signup: No  Discharge Durable Medical Equipment: No  Physical Therapy Consult: Yes  Occupational Therapy Consult: Yes  Speech Therapy Consult: Yes  Support Systems: Child(fransisca),Caregiver/Home Care Staff  Confirm Follow Up Transport: Family  The Plan for Transition of Care is Related to the Following Treatment Goals : STR to improve pt's strength and functional abilities for a safe transition to her next level of care.   The Patient and/or Patient Representative was Provided with a Choice of Provider and Agrees with the Discharge Plan?: Yes  Name of the Patient Representative Who was Provided with a Choice of Provider and Agrees with the Discharge Plan: Melanie Saxena/dtbari  [place] of Choice List was Provided with Basic Dialogue that Supports the Patient's Individualized Plan of Care/Goals, Treatment Preferences and Shares the Quality Data Associated with the Providers?: Yes  Discharge Location  Discharge Placement: Rehab Unit Subacute

## 2022-01-09 NOTE — PROGRESS NOTES
ACUTE OT GOALS:  (Developed with and agreed upon by patient and/or caregiver.)  1. Patient will complete lower body bathing and dressing with MIN A and adaptive equipment as needed. 2.Patient will complete upper body bathing and dressing with SUPERVISION and adaptive equipment as needed. 3. Patient will complete toileting with MIN A.   4. Patient will tolerate 25 minutes of OT treatment with 1-2 rest breaks to increase activity tolerance for ADLs. 5. Patient will complete functional transfers with CGA and adaptive equipment as needed. 6. Patient will complete functional activity with SUPERVISION and adaptive equipment as needed. Timeframe: 7 visits       OCCUPATIONAL THERAPY ASSESSMENT: Initial Assessment and Daily Note OT Treatment Day # 1    Mathieu Kapoor is a 80 y.o. female   PRIMARY DIAGNOSIS: CAP (community acquired pneumonia)  CAP (community acquired pneumonia) [J18.9]       Reason for Referral:    ICD-10: Treatment Diagnosis: Generalized Muscle Weakness (M62.81)  Other lack of cordination (R27.8)  Difficulty in walking, Not elsewhere classified (R26.2)  INPATIENT: [place] / Plan[place] / Product Type: Medicare /   ASSESSMENT:     REHAB RECOMMENDATIONS:   Recommendation to date pending progress:  Setting:   Short-term Rehab  Equipment:    To Be Determined     PRIOR LEVEL OF FUNCTION:  (Prior to Hospitalization)  INITIAL/CURRENT LEVEL OF FUNCTION:  (Based on today's evaluation)   Bathing:   Minimal Assistance  Dressing:   Minimal Assistance  Feeding/Grooming:   Supervision  Toileting:   Supervision  Functional Mobility:   Supervision Bathing:   Minimal Assistance UB, maximum assistance LB  Dressing:   Minimal Assistance UB, maximum assistance LB  Feeding/Grooming:   Contact Guard Assistance  Toileting:   Maximal Assistance  Functional Mobility:   Moderate Assistance     ASSESSMENT:  Ms. Greg Isbell presented to the hospital with increased weakness and confusion. At baseline, pt has lived with her daughter for the last 22 months and begun to progressively require increased assistance for ADLs and mobility. Daughter currently assists in all ADLs and IADLs. Today, pt was received supine in bed. Completed bed mobility modA. Performed grooming task sitting EOB CGA. Mattie for UB dressing/bathing, maxA for LB dressing/bathing. MaxA for toileting in standing. Sit>stand and SPT to chair modA. Pt noted to have posterior LOB with maxA to correct and resulting in returning to sitting for safety. Very poor activity tolerance for mobility. Pt's daughter present in room throughout entire session--expressed she no longer feels she can safely care for her mother at her current assist level. RN present during session. Kishorbrittany Walton currently demonstrates overall deficits in strength, balance, activity tolerance, and ADL performance. Patient would benefit from skilled OT services at this time in order to address functional deficits and OT goals stated above. SUBJECTIVE:   Ms. Trang Villa states, \"I don't think I can do it. \"    SOCIAL HISTORY/LIVING ENVIRONMENT: lives with her daughter in a multi level home, remains on main level, elevator access to get to main level, ambulated with RW, has been sponge bathing vs getting in the shower, requires assist for all ADLs and IADLs, daughter reported she has begun to require increased assistance recently and does not believe she can safely care for her at home  Home Environment: Private residence  One/Two Story Residence: One story  Living Alone: No  Support Systems: Child(fransisca)    OBJECTIVE:     PAIN: VITAL SIGNS: LINES/DRAINS:   Pre Treatment: Pain Screen  Pain Scale 1: Numeric (0 - 10)  Pain Intensity 1: 0  Post Treatment: no change    IV and Purewick  O2 Device: None (Room air)     GROSS EVALUATION:  BUEs Within Functional Limits Abnormal/ Functional Abnormal/ Non-Functional (see comments) Not Tested Comments:   AROM [] [x] [] []    PROM [] [] [] [x]    Strength [] [x] [] [] Generalized weakness BUEs   Balance [] [x] [] [] Fair+ sitting, fair- standing    Posture [] [x] [] []    Sensation [x] [] [] []    Coordination [] [x] [] []    Tone [] [] [] [x]    Edema [] [] [] [x]    Activity Tolerance [] [x] [] []     [] [] [] []      COGNITION/  PERCEPTION: Intact Impaired   (see comments) Comments:   Orientation [x] []    Vision [x] []    Hearing [x] []    Judgment/ Insight [] [x] Decreased safety awareness    Attention [x] []    Memory [x] []    Command Following [x] []    Emotional Regulation [x] []     [] []      ACTIVITIES OF DAILY LIVING: I Mod I S SBA CGA Min Mod Max Total NT Comments   BASIC ADLs:              Bathing/ Showering [] [] [] [] [] [x] [] [x] [] [] Mattie UB, maxA LB   Toileting [] [] [] [] [] [] [] [x] [] [] MaxA for adan-care and brief management in standing    Dressing [] [] [] [] [] [x] [] [x] [] [] Mattie for gown change, maxA for socks    Feeding [] [] [] [] [] [] [] [] [] [x]    Grooming [] [] [] [] [x] [] [] [] [] [] Washed face sitting EOB   Personal Device Care [] [] [] [] [] [] [] [] [] [x]    Functional Mobility [] [] [] [] [] [] [x] [] [] [] SPT to chair    I=Independent, Mod I=Modified Independent, S=Supervision, SBA=Standby Assistance, CGA=Contact Guard Assistance,   Min=Minimal Assistance, Mod=Moderate Assistance, Max=Maximal Assistance, Total=Total Assistance, NT=Not Tested    MOBILITY: I Mod I S SBA CGA Min Mod Max Total  NT x2 Comments:   Supine to sit [] [] [] [] [] [] [x] [] [] [] []    Sit to supine [] [] [] [] [] [] [] [] [] [x] [] Left sitting in the chair    Sit to stand [] [] [] [] [] [] [x] [] [] [] []    Bed to chair [] [] [] [] [] [] [x] [] [] [] [] SPT   I=Independent, Mod I=Modified Independent, S=Supervision, SBA=Standby Assistance, CGA=Contact Guard Assistance,   Min=Minimal Assistance, Mod=Moderate Assistance, Max=Maximal Assistance, Total=Total Assistance, NT=Not Tested    Dimitry Daily Activity Inpatient Short Form        How much help from another person does the patient currently need. .. Total A Lot A Little None   1. Putting on and taking off regular lower body clothing? [] 1   [x] 2   [] 3   [] 4   2. Bathing (including washing, rinsing, drying)? [] 1   [x] 2   [] 3   [] 4   3. Toileting, which includes using toilet, bedpan or urinal?   [] 1   [x] 2   [] 3   [] 4   4. Putting on and taking off regular upper body clothing? [] 1   [] 2   [x] 3   [] 4   5. Taking care of personal grooming such as brushing teeth? [] 1   [] 2   [x] 3   [] 4   6. Eating meals? [] 1   [] 2   [] 3   [x] 4   © 2007, Trustees of [facility] [employer], under license to [employer]. All rights reserved     Score:  Initial: 16 completed on 1/9/22 Most Recent: X (Date: -- )   Interpretation of Tool:  Represents activities that are increasingly more difficult (i.e. Bed mobility, Transfers, Gait). PLAN:   FREQUENCY/DURATION: OT Plan of Care: 3 times/week for duration of hospital stay or until stated goals are met, whichever comes first.    PROBLEM LIST:   (Skilled intervention is medically necessary to address:)  1. Decreased ADL/Functional Activities  2. Decreased Activity Tolerance  3. Decreased Balance  4. Decreased Cognition  5. Decreased Coordination  6. Decreased Strength  7. Decreased Transfer Abilities   INTERVENTIONS PLANNED:   (Benefits and precautions of occupational therapy have been discussed with the patient.)  1. Self Care Training  2. Therapeutic Activity  3. Therapeutic Exercise/HEP  4. Neuromuscular Re-education  5. Education     TREATMENT:     EVALUATION: Low Complexity : (Untimed Charge)    TREATMENT:   ($$ Self Care/Home Management: 8-22 mins    )  Self Care (15 Minutes): Self care including Upper Body Bathing, Lower Body Bathing, Toileting, Upper Body Dressing, Lower Body Dressing and Grooming to increase independence and decrease level of assistance required.     TREATMENT GRID:  N/A    AFTER TREATMENT POSITION/PRECAUTIONS:  Chair, Needs within reach and RN notified    INTERDISCIPLINARY COLLABORATION:  RN/PCT and OT/CANTOR    TOTAL TREATMENT DURATION:  OT Patient Time In/Time Out  Time In: 2644  Time Out: [address] [facility]

## 2022-01-09 NOTE — PROGRESS NOTES
SPEECH LANGUAGE PATHOLOGY: DYSPHAGIA- Initial Assessment and Discharge    NAME/AGE/GENDER: Jessenia Rolon is a 80 y.o. female  DATE: 1/9/2022  PRIMARY DIAGNOSIS: CAP (community acquired pneumonia) [J18.9]      ICD-10: Treatment Diagnosis: R13.12 Dysphagia, Oropharyngeal Phase    RECOMMENDATIONS   DIET:    Regular Consistency   Thin Liquids    MEDICATIONS: With liquid or in puree as needed     PRECAUTIONS, MODIFICATIONS, AND STRATEGIES  · Slow rate of intake  · Small bites/sips  · Upright at 90 degrees during meal  · Alternate liquids/solids  · Small sips and bites     RECOMMENDATIONS FOR CONTINUED SPEECH THERAPY:   No further speech therapy indicated at this time. ASSESSMENT   Patient presents with oropharyngeal swallow function that is within normal limits. No s/sx of dysphagia identified. Upgrade to regular diet textures accordingly. Recommend regular diet/thin liquids. No further skilled speech therapy services indicated. EDUCATION:  · Recommendations discussed with Patient    PLAN    FREQUENCY/DURATION:   No further speech therapy indicated at this time as oropharyngeal swallow function is within normal limits. CONTINUATION OF SKILLED SERVICES/MEDICAL NECESSITY:   No additional speech services warranted.      SUBJECTIVE   Pleasant, provides history of fall    Oxygen Device: RA  Pain: Pain Scale 1: Numeric (0 - 10)  Pain Intensity 1: 0    History of Present Injury/Illness: Ms. Eloina Cano  has a past medical history of Acute kidney injury ([facility] (12/29/2013), Atrial fibrillation ([facility] [address] Bell's palsy, Benign hypertensive heart disease without heart failure, Bowel obstruction ([facility] [address] [facility] (8/20/2011), Congestive heart failure, unspecified, Diverticulosis, Flat foot(734), Hemorrhage of rectum and anus, Hiatal hernia, Hyperlipidemia, Hypertension, Hypertonicity of bladder, Hypothyroidism, Loss of height, Menopause, Osteoporosis, Reflux esophagitis, Restless legs syndrome (RLS), Slow transit constipation, Stricture and stenosis of esophagus, Unspecified gastritis and gastroduodenitis without mention of hemorrhage, Unspecified hemorrhoids with other complication, Unspecified hereditary and idiopathic peripheral neuropathy, and Urge incontinence. She has no past medical history of Dementia. . She also  has a past surgical history that includes hx pacemaker; hx appendectomy; hx heent; hx hysterectomy; hx tonsillectomy; hx cholecystectomy; pr breast surgery procedure unlisted; and neurological procedure unlisted. PRECAUTIONS/ALLERGIES: Erythromycin and Sulfa (sulfonamide antibiotics)     Problem List:  (Impairments causing functional limitations):  1. CAP    Previous Dysphagia: NONE REPORTED  Diet Prior to Evaluation: easy to chew/thin    Orientation:  Person  Place  Situation    Cognitive-Linguistic Screening:   Alertness  o Alert   Speech Production:   o Fully intelligible   Expressive Language:  o Fluent speech   Receptive Language:  o Answers yes/no questions appropriately and Follows commands   Cognition:   o No changes reported  Prior Level of Function: home with daughter  Recommendations: Given results of screening, patient appears to be functioning at baseline. No acute assessment of speech, language, or cognition warranted. OBJECTIVE   Oral Motor:   · Labial: No impairment  · Dentition: Intact  · Oral Hygiene: Dry  · Lingual: No impairment    Dysphagia evaluation:   Patient consumed trials of thin liquids via cup/straw sip, puree, mixed consistency fruit and cracker. Adequate mastication and oral clearance observed. No overt s/sx noted. Upgrade to regular diet textures.      Tool Used: Dysphagia Outcome and Severity Scale (CATHERINE)    Score Comments   Normal Diet  [x] 7 With no strategies or extra time needed   Functional Swallow  [] 6 May have mild oral or pharyngeal delay   Mild Dysphagia  [] 5 Which may require one diet consistency restricted    Mild-Moderate Dysphagia  [] 4 With 1-2 diet consistencies restricted   Moderate Dysphagia  [] 3 With 2 or more diet consistencies restricted   Moderate-Severe Dysphagia  [] 2 With partial PO strategies (trials with ST only)   Severe Dysphagia  [] 1 With inability to tolerate any PO safely      Score:  Initial: 7 Most Recent: x (Date 01/09/22 )   Interpretation of Tool: The Dysphagia Outcome and Severity Scale (CATHERINE) is a simple, easy-to-use, 7-point scale developed to systematically rate the functional severity of dysphagia based on objective assessment and make recommendations for diet level, independence level, and type of nutrition. Current Medications:   No current facility-administered medications on file prior to encounter. Current Outpatient Medications on File Prior to Encounter   Medication Sig Dispense Refill    omeprazole (PRILOSEC) 40 mg capsule TAKE ONE CAPSULE BY MOUTH EVERY DAY 30 Capsule 2    lovastatin (MEVACOR) 20 mg tablet TAKE 1 TABLET BY MOUTH EVERY DAY IN THE EVENING 90 Tablet 3    digoxin (LANOXIN) 0.125 mg tablet 0.125 mg. The pt takes 1/2 of the 0.125 mg  dose      azelastine (ASTELIN) 137 mcg (0.1 %) nasal spray 1 [place] by Both Nostrils route two (2) times a day. Use in each nostril as directed 1 Each 1    levothyroxine (SYNTHROID) 50 mcg tablet TAKE 1 TABLET BY MOUTH DAILY 90 Tablet 3    fexofenadine (ALLEGRA) 180 mg tablet Take 180 mg by mouth daily.  metoprolol tartrate (LOPRESSOR) 25 mg tablet Take 75 mg by mouth two (2) times a day.  furosemide (LASIX) 40 mg tablet Take 40 mg by mouth daily.  rivaroxaban (XARELTO) 15 mg tab tablet Take 1 Tab by mouth daily. 1 Tab 0    STOOL SOFTENER 100 mg capsule TAKE ONE CAPSULE BY MOUTH TWICE A DAY 60 Cap 11    calcium carbonate (CALTREX) 600 mg calcium (1,500 mg) tablet Take 1 Tab by mouth daily.  denosumab (PROLIA) 60 mg/mL injection 60 mg by SubCUTAneous route Once a year.  Twice a year      acetaminophen (TYLENOL EXTRA STRENGTH) 500 mg tablet Take 500 mg by mouth every six (6) hours as needed for Pain.  polyethylene glycol (MIRALAX) 17 gram/dose powder Take 17 g by mouth nightly.  albuterol (PROVENTIL VENTOLIN) 2.5 mg /3 mL (0.083 %) nebu 1.5 mL by Nebulization route two (2) times a day. Indications: wheezing (Patient not taking: Reported on 11/29/2021) 60 Nebule 1          After treatment position/precautions:  · Upright in bed  · Call light within reach    Total Treatment Duration:   Time In: 7309  Time Out: [address].  Basim Sanches MS, CCC-SLP         Speech Language Pathologist          [facility]ute [facility] Services                   Contact: Jody

## 2022-01-10 LAB
ALBUMIN SERPL-MCNC: 2.2 G/DL (ref 3.2–4.6)
ALBUMIN/GLOB SERPL: 0.5 {RATIO} (ref 1.2–3.5)
ALP SERPL-CCNC: 86 U/L (ref 50–136)
ALT SERPL-CCNC: 26 U/L (ref 12–65)
ANION GAP SERPL CALC-SCNC: 8 MMOL/L (ref 7–16)
AST SERPL-CCNC: 26 U/L (ref 15–37)
BASOPHILS # BLD: 0.1 K/UL (ref 0–0.2)
BASOPHILS NFR BLD: 1 % (ref 0–2)
BILIRUB SERPL-MCNC: 0.8 MG/DL (ref 0.2–1.1)
BUN SERPL-MCNC: 16 MG/DL (ref 8–23)
CALCIUM SERPL-MCNC: 8.6 MG/DL (ref 8.3–10.4)
CHLORIDE SERPL-SCNC: 106 MMOL/L (ref 98–107)
CO2 SERPL-SCNC: 26 MMOL/L (ref 21–32)
CREAT SERPL-MCNC: 0.9 MG/DL (ref 0.6–1)
DIFFERENTIAL METHOD BLD: ABNORMAL
EOSINOPHIL # BLD: 1.2 K/UL (ref 0–0.8)
EOSINOPHIL NFR BLD: 10 % (ref 0.5–7.8)
ERYTHROCYTE [DISTWIDTH] IN BLOOD BY AUTOMATED COUNT: 15.2 % (ref 11.9–14.6)
GLOBULIN SER CALC-MCNC: 4.2 G/DL (ref 2.3–3.5)
GLUCOSE SERPL-MCNC: 153 MG/DL (ref 65–100)
HCT VFR BLD AUTO: 40.8 % (ref 35.8–46.3)
HGB BLD-MCNC: 12.8 G/DL (ref 11.7–15.4)
IMM GRANULOCYTES # BLD AUTO: 0.1 K/UL (ref 0–0.5)
IMM GRANULOCYTES NFR BLD AUTO: 0 % (ref 0–5)
LYMPHOCYTES # BLD: 1.2 K/UL (ref 0.5–4.6)
LYMPHOCYTES NFR BLD: 10 % (ref 13–44)
MCH RBC QN AUTO: 29.4 PG (ref 26.1–32.9)
MCHC RBC AUTO-ENTMCNC: 31.4 G/DL (ref 31.4–35)
MCV RBC AUTO: 93.6 FL (ref 79.6–97.8)
MM INDURATION POC: 0 MM (ref 0–5)
MONOCYTES # BLD: 1 K/UL (ref 0.1–1.3)
MONOCYTES NFR BLD: 9 % (ref 4–12)
NEUTS SEG # BLD: 8 K/UL (ref 1.7–8.2)
NEUTS SEG NFR BLD: 70 % (ref 43–78)
NRBC # BLD: 0 K/UL (ref 0–0.2)
PLATELET # BLD AUTO: 403 K/UL (ref 150–450)
PMV BLD AUTO: 9.6 FL (ref 9.4–12.3)
POTASSIUM SERPL-SCNC: 3.9 MMOL/L (ref 3.5–5.1)
PPD POC: NEGATIVE NEGATIVE
PROT SERPL-MCNC: 6.4 G/DL (ref 6.3–8.2)
RBC # BLD AUTO: 4.36 M/UL (ref 4.05–5.2)
SARS-COV-2, COV2: NOT DETECTED
SODIUM SERPL-SCNC: 140 MMOL/L (ref 136–145)
SPECIMEN SOURCE, FCOV2M: NORMAL
WBC # BLD AUTO: 11.5 K/UL (ref 4.3–11.1)

## 2022-01-10 PROCEDURE — 74011250637 HC RX REV CODE- 250/637: Performed by: STUDENT IN AN ORGANIZED HEALTH CARE EDUCATION/TRAINING PROGRAM

## 2022-01-10 PROCEDURE — 74011000250 HC RX REV CODE- 250: Performed by: STUDENT IN AN ORGANIZED HEALTH CARE EDUCATION/TRAINING PROGRAM

## 2022-01-10 PROCEDURE — 85025 COMPLETE CBC W/AUTO DIFF WBC: CPT

## 2022-01-10 PROCEDURE — 80053 COMPREHEN METABOLIC PANEL: CPT

## 2022-01-10 PROCEDURE — 74011000258 HC RX REV CODE- 258: Performed by: STUDENT IN AN ORGANIZED HEALTH CARE EDUCATION/TRAINING PROGRAM

## 2022-01-10 PROCEDURE — 65270000029 HC RM PRIVATE

## 2022-01-10 PROCEDURE — 74011250636 HC RX REV CODE- 250/636: Performed by: STUDENT IN AN ORGANIZED HEALTH CARE EDUCATION/TRAINING PROGRAM

## 2022-01-10 PROCEDURE — 36415 COLL VENOUS BLD VENIPUNCTURE: CPT

## 2022-01-10 PROCEDURE — 74011250636 HC RX REV CODE- 250/636: Performed by: FAMILY MEDICINE

## 2022-01-10 PROCEDURE — 74011250637 HC RX REV CODE- 250/637: Performed by: FAMILY MEDICINE

## 2022-01-10 RX ADMIN — RIVAROXABAN 15 MG: 15 TABLET, FILM COATED ORAL at 08:39

## 2022-01-10 RX ADMIN — FUROSEMIDE 40 MG: 10 INJECTION, SOLUTION INTRAMUSCULAR; INTRAVENOUS at 08:45

## 2022-01-10 RX ADMIN — ATORVASTATIN CALCIUM 10 MG: 10 TABLET, FILM COATED ORAL at 08:39

## 2022-01-10 RX ADMIN — ONDANSETRON 4 MG: 2 INJECTION INTRAMUSCULAR; INTRAVENOUS at 22:11

## 2022-01-10 RX ADMIN — DIGOXIN 0.06 MG: 125 TABLET ORAL at 08:39

## 2022-01-10 RX ADMIN — METOPROLOL TARTRATE 100 MG: 50 TABLET, FILM COATED ORAL at 08:39

## 2022-01-10 RX ADMIN — SODIUM CHLORIDE, PRESERVATIVE FREE 10 ML: 5 INJECTION INTRAVENOUS at 15:04

## 2022-01-10 RX ADMIN — DOXYCYCLINE HYCLATE 100 MG: 100 CAPSULE ORAL at 00:56

## 2022-01-10 RX ADMIN — DILTIAZEM HYDROCHLORIDE 30 MG: 30 TABLET, FILM COATED ORAL at 06:06

## 2022-01-10 RX ADMIN — DOXYCYCLINE HYCLATE 100 MG: 100 CAPSULE ORAL at 21:23

## 2022-01-10 RX ADMIN — DILTIAZEM HYDROCHLORIDE 30 MG: 30 TABLET, FILM COATED ORAL at 21:23

## 2022-01-10 RX ADMIN — DOXYCYCLINE HYCLATE 100 MG: 100 CAPSULE ORAL at 08:39

## 2022-01-10 RX ADMIN — PANTOPRAZOLE SODIUM 40 MG: 40 TABLET, DELAYED RELEASE ORAL at 06:06

## 2022-01-10 RX ADMIN — SODIUM CHLORIDE, PRESERVATIVE FREE 5 ML: 5 INJECTION INTRAVENOUS at 21:24

## 2022-01-10 RX ADMIN — LEVOTHYROXINE SODIUM 50 MCG: 0.05 TABLET ORAL at 06:06

## 2022-01-10 RX ADMIN — CALCIUM CARBONATE (ANTACID) CHEW TAB 500 MG 600 MG: 500 CHEW TAB at 11:58

## 2022-01-10 RX ADMIN — CEFEPIME HYDROCHLORIDE 2 G: 2 INJECTION, POWDER, FOR SOLUTION INTRAVENOUS at 00:56

## 2022-01-10 RX ADMIN — SODIUM CHLORIDE, PRESERVATIVE FREE 5 ML: 5 INJECTION INTRAVENOUS at 00:52

## 2022-01-10 RX ADMIN — SODIUM CHLORIDE, PRESERVATIVE FREE 5 ML: 5 INJECTION INTRAVENOUS at 06:06

## 2022-01-10 NOTE — PROGRESS NOTES
Hospitalist Progress Note   Admit Date:  2022 10:48 AM   Name:  Bishop Wasserman   Age:  80 y.o. Sex:  female  :  3/11/1923   MRN:  [id number]   Room:  4/    Presenting Complaint: Lethargy    Reason(s) for Admission: CAP (community acquired pneumonia) [J18.9]     Hospital Course & Interval History:   Bishop Wasserman is a 80 y.o. female with medical history of atrial fibrillation on Xarelto, hypothyroidism, CHF, hypertension, pacemaker who presented with confusion, generalized weaknes and shortness of breath. CT head/spine negative for any acute abnormalities. Chest x-ray showed increasing bilateral infiltrates. CT showed bilateral pulmonary infiltrates and effusion. Patient admitted with possible community-acquired pneumonia. Patient also noted with A. ajay with RVR and had been given one-time 10 mg IV dose of Cardizem in the ED. Subjective (01/10/22): Patient is seen at the bedside. Reports she is feeling better. Denies chest pain, palpitation, nausea, vomiting or abdominal pain. Assessment & Plan:     Community-acquired pneumonia  Chest x-ray with bilateral infiltrates  Patient presented with hypoxia now currently on 3 L at 93%  Continue supplemental oxygen to maintain oxygen saturation greater than 90%  Continue cefepime  UA negative  Weaned to room air. Continue cefepime and doxycycline.     Congestive heart failure  BNP elevated greater than 4000  Patient given one-time dose Lasix in ED  Continue Lasix daily IV  Continue Lasix. Echocardiogram reviewed    Atrial fibrillation with RVR  Patient currently on digoxin but takes half of the 12.5 mg dose since levels were elevated last time she saw her PCP  Has been given one-time 10 mg IV dose of Cardizem  Cardiology consulted  Patient has been compliant on Xarelto  Digoxin level 0.7, continue digoxin and Cardizem p.o. Heart rate better controlled. Continue metoprolol 100 mg twice daily. Pending cardio consult. Echocardiogram reviewed     Hypothyroidism  Continue Synthroid     Hypertension  Continue home blood pressure medications     Type 2 diabetes  A1c of 7.9  Sliding scale insulin     Generalized weakness  PT/OT  Case management consulted for discharge planning     Sacral wound   Wound care consulted      Dispo/Discharge Planning:  PT/OT recommend short-term rehab. Case management consulted.   PPD ordered    Diet:  ADULT DIET Regular; Low Fat/Low Chol/High Fiber/SUDHA; No Salt Added (3-4 gm); 1500 ml  DVT PPx: Xarelto  Code status: DNR    Hospital Problems as of 1/10/2022 Date Reviewed: 11/29/2021          Codes Class Noted - Resolved POA    * (Principal) CAP (community acquired pneumonia) ICD-10-CM: J18.9  ICD-9-CM: 352  1/7/2022 - Present Unknown        DM2 (diabetes mellitus, type 2) ([facility] ICD-10-CM: E11.9  ICD-9-CM: 250.00  1/7/2022 - Present Unknown        Weakness generalized ICD-10-CM: R53.1  ICD-9-CM: 780.79  1/7/2022 - Present Unknown        Sacral wound ICD-10-CM: S31.000A  ICD-9-CM: 959.19  1/7/2022 - Present Unknown        Hypertension ICD-10-CM: I10  ICD-9-CM: 401.9  Unknown - Present Yes        Atrial fibrillation with RVR ([facility] ICD-10-CM: I48.91  ICD-9-CM: 427.31  8/19/2011 - Present Unknown        Acquired hypothyroidism ICD-10-CM: E03.9  ICD-9-CM: 244.9  8/19/2011 - Present Unknown              Objective:     Patient Vitals for the past 24 hrs:   Temp Pulse Resp BP SpO2   01/10/22 1110 97.6 °F (36.4 °C) (!) 102 18 (!) 107/55 95 %   01/10/22 0749 97.4 °F (36.3 °C) (!) 106 18 112/76 94 %   01/10/22 0328 97.8 °F (36.6 °C) 79 16 (!) 140/81 93 %   01/09/22 2325 98.1 °F (36.7 °C) (!) 101 18 (!) 117/52 92 %   01/09/22 1923 97.7 °F (36.5 °C) 68 20 (!) 93/48 94 %   01/09/22 1824    (!) 110/49    01/09/22 1541 98 °F (36.7 °C) 98 18 (!) 110/49 94 %     Oxygen Therapy  O2 Sat (%): 95 % (01/10/22 1110)  Pulse via Oximetry: 124 beats per minute (01/07/22 1525)  O2 Device: None (Room air) (01/09/22 2325)  O2 Flow Rate (L/min): 3 l/min (01/07/22 1133)    Estimated body mass index is 26.31 kg/m² as calculated from the following:    Height as of this encounter: 5' 6\" (1.676 m). Weight as of this encounter: 73.9 kg (163 lb). Intake/Output Summary (Last 24 hours) at 1/10/2022 1231  Last data filed at 1/10/2022 1022  Gross per 24 hour   Intake    Output 1650 ml   Net -1650 ml         Physical Exam:     Blood pressure (!) 107/55, pulse (!) 102, temperature 97.6 °F (36.4 °C), resp. rate 18, height 5' 6\" (1.676 m), weight 73.9 kg (163 lb), SpO2 95 %. General:    Well nourished. No overt distress  Head:  Normocephalic, atraumatic  Eyes:  Sclerae appear normal.  Pupils equally round. ENT:  Nares appear normal, no drainage. Moist oral mucosa  Neck:  No restricted ROM. Trachea midline   CV:   RRR. No m/r/g. No jugular venous distension. Lungs:   CTAB. No wheezing, rhonchi, or rales. Respirations even, unlabored  Abdomen: Bowel sounds present. Soft, nontender, nondistended. Extremities: No cyanosis or clubbing. No edema  Skin:     No rashes and normal coloration. Warm and dry. Neuro:  CN II-XII grossly intact. Sensation intact. A&Ox3  Psych:  Normal mood and affect.       I have reviewed ordered lab tests and independently visualized imaging below:    Recent Labs:  Recent Results (from the past 48 hour(s))   CBC WITH AUTOMATED DIFF    Collection Time: 01/09/22  9:58 AM   Result Value Ref Range    WBC 10.9 4.3 - 11.1 K/uL    RBC 3.84 (L) 4.05 - 5.2 M/uL    HGB 11.5 (L) 11.7 - 15.4 g/dL    HCT 36.5 35.8 - 46.3 %    MCV 95.1 79.6 - 97.8 FL    MCH 29.9 26.1 - 32.9 PG    MCHC 31.5 31.4 - 35.0 g/dL    RDW 15.4 (H) 11.9 - 14.6 %    PLATELET 364 691 - 984 K/uL    MPV 9.6 9.4 - 12.3 FL    ABSOLUTE NRBC 0.00 0.0 - 0.2 K/uL    DF AUTOMATED      NEUTROPHILS 72 43 - 78 %    LYMPHOCYTES 9 (L) 13 - 44 %    MONOCYTES 8 4.0 - 12.0 %    EOSINOPHILS 10 (H) 0.5 - 7.8 %    BASOPHILS 1 0.0 - 2.0 %    IMMATURE GRANULOCYTES 1 0.0 - 5.0 %    ABS. NEUTROPHILS 7.8 1.7 - 8.2 K/UL    ABS. LYMPHOCYTES 1.0 0.5 - 4.6 K/UL    ABS. MONOCYTES 0.9 0.1 - 1.3 K/UL    ABS. EOSINOPHILS 1.1 (H) 0.0 - 0.8 K/UL    ABS. BASOPHILS 0.1 0.0 - 0.2 K/UL    ABS. IMM. GRANS. 0.1 0.0 - 0.5 K/UL   METABOLIC PANEL, COMPREHENSIVE    Collection Time: 01/09/22  9:58 AM   Result Value Ref Range    Sodium 137 136 - 145 mmol/L    Potassium 3.7 3.5 - 5.1 mmol/L    Chloride 102 98 - 107 mmol/L    CO2 28 21 - 32 mmol/L    Anion gap 7 7 - 16 mmol/L    Glucose 185 (H) 65 - 100 mg/dL    BUN 17 8 - 23 MG/DL    Creatinine 1.18 (H) 0.6 - 1.0 MG/DL    GFR est AA 54 (L) >60 ml/min/1.73m2    GFR est non-AA 45 (L) >60 ml/min/1.73m2    Calcium 8.3 8.3 - 10.4 MG/DL    Bilirubin, total 0.6 0.2 - 1.1 MG/DL    ALT (SGPT) 20 12 - 65 U/L    AST (SGOT) 27 15 - 37 U/L    Alk.  phosphatase 91 50 - 136 U/L    Protein, total 6.0 (L) 6.3 - 8.2 g/dL    Albumin 2.2 (L) 3.2 - 4.6 g/dL    Globulin 3.8 (H) 2.3 - 3.5 g/dL    A-G Ratio 0.6 (L) 1.2 - 3.5     SARS-COV-2    Collection Time: 01/09/22  5:10 PM   Result Value Ref Range    SARS-CoV-2 Please find results under separate order     SARS-COV-2, PCR    Collection Time: 01/09/22  5:10 PM    Specimen: Nasopharyngeal   Result Value Ref Range    Specimen source Nasopharyngeal      SARS-CoV-2 Not detected NOTD     ECHO ADULT COMPLETE    Collection Time: 01/09/22  6:25 PM   Result Value Ref Range    IVSd 1.3 (A) 0.6 - 0.9 cm    LVIDd 4.5 3.9 - 5.3 cm    LVIDs 3.2 cm    LVOT Diameter 1.8 cm    LVPWd 1.2 (A) 0.6 - 0.9 cm    LVOT SV 35.1 ml    EF BP 67 55 - 100 %    LV Ejection Fraction A2C 67 %    LV Ejection Fraction A4C 68 %    LV EDV A2C 45 mL    LV EDV A4C 39 mL    LV EDV BP 41 (A) 56 - 104 mL    LV ESV A2C 15 mL    LV ESV A4C 12 mL    LV ESV BP 14 (A) 19 - 49 mL    LVOT Peak Gradient 2 mmHg    LVOT Mean Gradient 1 mmHg    LVOT Peak Velocity 0.7 m/s    LVOT VTI 13.8 cm    RVIDd 3.1 cm    LA Diameter 4.9 cm    AV Area by Peak Velocity 1.2 cm2    AV Area by Peak Velocity 1.2 cm2    AV Area by VTI 1.5 cm2    AV Area by VTI 1.5 cm2    AR .5 millisecond    AR Max Velocity PISA 3.8 m/s    AV Peak Gradient 9 mmHg    AV Mean Gradient 4 mmHg    AV Peak Velocity 1.5 m/s    AV Mean Velocity 1.0 m/s    AV VTI 22.5 cm    MV Nyquist Velocity 34 cm/s    MV Regurg Velocity PISA 4.4 m/s    MR .6 cm    TAPSE 1.4 (A) 1.5 - 2.0 cm    TR Peak Gradient 45 mmHg    TR Max Velocity 3.05 m/s    Ascending Aorta 2.9 cm    Aortic Root 2.9 cm    Fractional Shortening 2D 29 28 - 44 %    LV ESV Index BP 8 mL/m2    LV ESV Index A4C 7 mL/m2    LV EDV Index A4C 21 mL/m2    LV ESV Index A2C 8 mL/m2    LV EDV Index A2C 25 mL/m2    LVIDd Index 2.46 cm/m2    LVIDs Index 1.75 cm/m2    LV RWT Ratio 0.53     LV Mass 2D 210.2 (A) 67 - 162 g    LV Mass 2D Index 114.8 (A) 43 - 95 g/m2    LVOT Stroke Volume Index 19.2 mL/m2    LVOT Area 2.5 cm2    LA Size Index 2.68 cm/m2    LA/AO Root Ratio 1.69     Ao Root Index 1.58 cm/m2    Ascending Aorta Index 1.58 cm/m2    AV Velocity Ratio 0.47     LVOT:AV VTI Index 0.61     LA Volume 2C 99 (A) 22 - 52 mL    LA Volume Index 2C 54 (A) 16 - 34 mL/m2    LA Volume 4C 95 (A) 22 - 52 mL    LA Volume Index 4C 52 (A) 16 - 34 mL/m2    LV EDV Index BP 22 mL/m2    LV ESV Teich 40 mL    LV ESV Index Teich 22 mL/m2    LV EDV Teich 91 mL    LV EDV Index Teich 50 mL/m2    Est. RA Pressure 3 mmHg    RVSP 40 mmHg   CBC WITH AUTOMATED DIFF    Collection Time: 01/10/22  5:43 AM   Result Value Ref Range    WBC 11.5 (H) 4.3 - 11.1 K/uL    RBC 4.36 4.05 - 5.2 M/uL    HGB 12.8 11.7 - 15.4 g/dL    HCT 40.8 35.8 - 46.3 %    MCV 93.6 79.6 - 97.8 FL    MCH 29.4 26.1 - 32.9 PG    MCHC 31.4 31.4 - 35.0 g/dL    RDW 15.2 (H) 11.9 - 14.6 %    PLATELET 556 296 - 423 K/uL    MPV 9.6 9.4 - 12.3 FL    ABSOLUTE NRBC 0.00 0.0 - 0.2 K/uL    DF AUTOMATED      NEUTROPHILS 70 43 - 78 %    LYMPHOCYTES 10 (L) 13 - 44 %    MONOCYTES 9 4.0 - 12.0 %    EOSINOPHILS 10 (H) 0.5 - 7.8 %    BASOPHILS 1 0.0 - 2.0 %    IMMATURE GRANULOCYTES 0 0.0 - 5.0 %    ABS. NEUTROPHILS 8.0 1.7 - 8.2 K/UL    ABS. LYMPHOCYTES 1.2 0.5 - 4.6 K/UL    ABS. MONOCYTES 1.0 0.1 - 1.3 K/UL    ABS. EOSINOPHILS 1.2 (H) 0.0 - 0.8 K/UL    ABS. BASOPHILS 0.1 0.0 - 0.2 K/UL    ABS. IMM. GRANS. 0.1 0.0 - 0.5 K/UL   METABOLIC PANEL, COMPREHENSIVE    Collection Time: 01/10/22  5:43 AM   Result Value Ref Range    Sodium 140 136 - 145 mmol/L    Potassium 3.9 3.5 - 5.1 mmol/L    Chloride 106 98 - 107 mmol/L    CO2 26 21 - 32 mmol/L    Anion gap 8 7 - 16 mmol/L    Glucose 153 (H) 65 - 100 mg/dL    BUN 16 8 - 23 MG/DL    Creatinine 0.90 0.6 - 1.0 MG/DL    GFR est AA >60 >60 ml/min/1.73m2    GFR est non-AA >60 >60 ml/min/1.73m2    Calcium 8.6 8.3 - 10.4 MG/DL    Bilirubin, total 0.8 0.2 - 1.1 MG/DL    ALT (SGPT) 26 12 - 65 U/L    AST (SGOT) 26 15 - 37 U/L    Alk. phosphatase 86 50 - 136 U/L    Protein, total 6.4 6.3 - 8.2 g/dL    Albumin 2.2 (L) 3.2 - 4.6 g/dL    Globulin 4.2 (H) 2.3 - 3.5 g/dL    A-G Ratio 0.5 (L) 1.2 - 3.5         All Micro Results     Procedure Component Value Units Date/Time    SARS-COV-2, PCR [[id number]] Collected: 01/09/22 1710    Order Status: Completed Specimen: Nasopharyngeal Updated: 01/10/22 0941     Specimen source Nasopharyngeal        SARS-CoV-2 Not detected        Comment:      The specimen is NEGATIVE for SARS-CoV-2, the novel coronavirus associated with COVID-19. This test has been authorized by the FDA under an Emergency Use Authorization (EUA) for use by authorized laboratories.         Fact sheet for Healthcare Providers: [employer]nz       Fact sheet for Patients: [employer]Update[employer]co.nz       Methodology: RT-PCR         BLOOD CULTURE [[id number]] Collected: 01/07/22 1157    Order Status: Completed Specimen: Blood Updated: 01/10/22 0716     Special Requests: --        RIGHT  Antecubital       Culture result: NO GROWTH 3 DAYS       BLOOD CULTURE [[id number]] Collected: 01/07/22 1158    Order Status: Completed Specimen: Blood Updated: 01/10/22 0716     Special Requests: --        LEFT  ARM       Culture result: NO GROWTH 3 DAYS       COVID-19 RAPID TEST [[id number]] Collected: 01/07/22 1157    Order Status: Completed Specimen: Nasopharyngeal Updated: 01/07/22 1231     Specimen source NASAL        COVID-19 rapid test Not detected        Comment:      The specimen is NEGATIVE for SARS-CoV-2, the novel coronavirus associated with COVID-19. A negative result does not rule out COVID-19. This test has been authorized by the FDA under an Emergency Use Authorization (EUA) for use by authorized laboratories. Fact sheet for Healthcare Providers: [facility]  Fact sheet for Patients: [employer]       Methodology: Isothermal Nucleic Acid Amplification               Other Studies:  ECHO ADULT COMPLETE    Result Date: 1/9/2022    Left Ventricle: Left ventricle size is normal. Mildly increased wall thickness. Normal wall motion. The EF by visual approximation is 60 - 65%.   Aortic Valve: Mild to moderate transvalvular regurgitation.   Mitral Valve: There is moderate posterior leaflet prolapse with resultant anteriorly directed mitral regurgitation which is moderate to severe in severity. The eccentric nature of the jet may underestimate the severity.   Left Atrium: Left atrium is severely dilated.        Current Meds:  Current Facility-Administered Medications   Medication Dose Route Frequency    metoprolol tartrate (LOPRESSOR) tablet 100 mg  100 mg Oral BID    furosemide (LASIX) injection 40 mg  40 mg IntraVENous DAILY    tuberculin injection 5 Units  5 Units IntraDERMal ONCE    doxycycline (VIBRAMYCIN) capsule 100 mg  100 mg Oral Q12H    dilTIAZem IR (CARDIZEM) tablet 30 mg  30 mg Oral AC&HS    cyclobenzaprine (FLEXERIL) tablet 5 mg  5 mg Oral TID PRN    rivaroxaban (XARELTO) tablet 15 mg  15 mg Oral DAILY WITH BREAKFAST    pantoprazole (PROTONIX) tablet 40 mg  40 mg Oral ACB    atorvastatin (LIPITOR) tablet 10 mg  10 mg Oral DAILY    levothyroxine (SYNTHROID) tablet 50 mcg  50 mcg Oral 6am    calcium carbonate (TUMS) chewable tablet 600 mg [elemental]  600 mg Oral DAILY WITH LUNCH    digoxin (LANOXIN) tablet 0.0625 mg  0.0625 mg Oral DAILY    sodium chloride (NS) flush 5-40 mL  5-40 mL IntraVENous Q8H    sodium chloride (NS) flush 5-40 mL  5-40 mL IntraVENous PRN    acetaminophen (TYLENOL) tablet 650 mg  650 mg Oral Q6H PRN    Or    acetaminophen (TYLENOL) suppository 650 mg  650 mg Rectal Q6H PRN    polyethylene glycol (MIRALAX) packet 17 g  17 g Oral DAILY PRN    ondansetron (ZOFRAN ODT) tablet 4 mg  4 mg Oral Q8H PRN    Or    ondansetron (ZOFRAN) injection 4 mg  4 mg IntraVENous Q6H PRN    cefepime (MAXIPIME) 2 g in 0.9% sodium chloride (MBP/ADV) 100 mL MBP  2 g IntraVENous Q12H       Signed:  Valencia Yuen MD    Part of this note may have been written by using a voice dictation software. The note has been proof read but may still contain some grammatical/other typographical errors.

## 2022-01-10 NOTE — WOUND CARE
Patient seen for small 0.5cm shallow pink ulcer to right side of gluteal cleft fold. Silicone foam in use, will add zinc paste to area. It is from moisture associated skin damage/friction. Discussed with patient, understanding verbalized. Wound team will continue to monitor and adjust treatment as needed. No pressure injury found.

## 2022-01-10 NOTE — PROGRESS NOTES
[facility] is not accepting admissions at this time. Bed offer accepted and selected for [facility]. Confirming that facility will have bed availability and if facility could accept CXR with admission today as patient's 48 hour PPD due to be read tomorrow.

## 2022-01-10 NOTE — PROGRESS NOTES
Facility can accept [facility] tomorrow morning after 1000. Transport scheduled for 1000 on 1/11/2022 with reference number [id number]. Attending notified. 48 hour PPD will need to be read prior to discharge. Daughter and patient updated at bedside. Care Management Interventions  PCP Verified by CM: Yes (Dr Emelyn Mancilla last visit November 2021)  Mode of Transport at Discharge: [facility] Transport Time of Discharge: 1000  Transition of Care Consult (CM Consult): Discharge Planning,SNF  MyChart Signup: No  Discharge Durable Medical Equipment: No  Physical Therapy Consult: Yes  Occupational Therapy Consult: Yes  Speech Therapy Consult: Yes  Support Systems: Child(fransisca),Caregiver/Home Care Staff  Confirm Follow Up Transport: Family  The Plan for Transition of Care is Related to the Following Treatment Goals : STR to improve pt's strength and functional abilities for a safe transition to her next level of care.   The Patient and/or Patient Representative was Provided with a Choice of Provider and Agrees with the Discharge Plan?: Yes  Name of the Patient Representative Who was Provided with a Choice of Provider and Agrees with the Discharge Plan: Diana Saxena/konstantin  Victor of Choice List was Provided with Basic Dialogue that Supports the Patient's Individualized Plan of Care/Goals, Treatment Preferences and Shares the Quality Data Associated with the Providers?: Yes  Discharge Location  Discharge Placement: Rehab Unit Subacute

## 2022-01-10 NOTE — PROGRESS NOTES
Problem: Pressure Injury - Risk of  Goal: *Prevention of pressure injury  Description: Document Brennan Scale and appropriate interventions in the flowsheet. Outcome: Progressing Towards Goal  Note: Pressure Injury Interventions:  Sensory Interventions: Assess changes in LOC,Check visual cues for pain    Moisture Interventions: Absorbent underpads,Check for incontinence Q2 hours and as needed,Internal/External urinary devices    Activity Interventions: Increase time out of bed,PT/OT evaluation    Mobility Interventions: HOB 30 degrees or less,PT/OT evaluation    Nutrition Interventions: Document food/fluid/supplement intake,Discuss nutritional consult with provider,Offer support with meals,snacks and hydration    Friction and Shear Interventions: Foam dressings/transparent film/skin sealants,Apply protective barrier, creams and emollients                Problem: Patient Education: Go to Patient Education Activity  Goal: Patient/Family Education  Outcome: Progressing Towards Goal     Problem: Falls - Risk of  Goal: *Absence of Falls  Description: Document Chiki Fall Risk and appropriate interventions in the flowsheet.   Outcome: Progressing Towards Goal  Note: Fall Risk Interventions:  Mobility Interventions: Bed/chair exit alarm,OT consult for ADLs,Patient to call before getting OOB,PT Consult for mobility concerns    Mentation Interventions: Adequate sleep, hydration, pain control,Door open when patient unattended    Medication Interventions: Bed/chair exit alarm,Evaluate medications/consider consulting pharmacy    Elimination Interventions: Call light in reach,Patient to call for help with toileting needs,Toileting schedule/hourly rounds    History of Falls Interventions: Bed/chair exit alarm,Consult care management for discharge planning,Door open when patient unattended         Problem: Patient Education: Go to Patient Education Activity  Goal: Patient/Family Education  Outcome: Progressing Towards Goal Problem: Patient Education: Go to Patient Education Activity  Goal: Patient/Family Education  Outcome: Progressing Towards Goal

## 2022-01-11 VITALS
OXYGEN SATURATION: 92 % | WEIGHT: 147.49 LBS | RESPIRATION RATE: 18 BRPM | HEART RATE: 87 BPM | DIASTOLIC BLOOD PRESSURE: 73 MMHG | SYSTOLIC BLOOD PRESSURE: 109 MMHG | BODY MASS INDEX: 23.7 KG/M2 | TEMPERATURE: 97.3 F | HEIGHT: 66 IN

## 2022-01-11 LAB
ALBUMIN SERPL-MCNC: 2.3 G/DL (ref 3.2–4.6)
ALBUMIN/GLOB SERPL: 0.5 {RATIO} (ref 1.2–3.5)
ALP SERPL-CCNC: 88 U/L (ref 50–136)
ALT SERPL-CCNC: 21 U/L (ref 12–65)
ANION GAP SERPL CALC-SCNC: 4 MMOL/L (ref 7–16)
AST SERPL-CCNC: 23 U/L (ref 15–37)
BASOPHILS # BLD: 0.1 K/UL (ref 0–0.2)
BASOPHILS NFR BLD: 1 % (ref 0–2)
BILIRUB SERPL-MCNC: 0.7 MG/DL (ref 0.2–1.1)
BUN SERPL-MCNC: 14 MG/DL (ref 8–23)
CALCIUM SERPL-MCNC: 8.9 MG/DL (ref 8.3–10.4)
CHLORIDE SERPL-SCNC: 103 MMOL/L (ref 98–107)
CO2 SERPL-SCNC: 32 MMOL/L (ref 21–32)
CREAT SERPL-MCNC: 1 MG/DL (ref 0.6–1)
DIFFERENTIAL METHOD BLD: ABNORMAL
EOSINOPHIL # BLD: 1 K/UL (ref 0–0.8)
EOSINOPHIL NFR BLD: 10 % (ref 0.5–7.8)
ERYTHROCYTE [DISTWIDTH] IN BLOOD BY AUTOMATED COUNT: 14.8 % (ref 11.9–14.6)
GLOBULIN SER CALC-MCNC: 4.2 G/DL (ref 2.3–3.5)
GLUCOSE SERPL-MCNC: 147 MG/DL (ref 65–100)
HCT VFR BLD AUTO: 41.3 % (ref 35.8–46.3)
HGB BLD-MCNC: 13.1 G/DL (ref 11.7–15.4)
IMM GRANULOCYTES # BLD AUTO: 0.1 K/UL (ref 0–0.5)
IMM GRANULOCYTES NFR BLD AUTO: 1 % (ref 0–5)
LYMPHOCYTES # BLD: 1.4 K/UL (ref 0.5–4.6)
LYMPHOCYTES NFR BLD: 14 % (ref 13–44)
MCH RBC QN AUTO: 30.3 PG (ref 26.1–32.9)
MCHC RBC AUTO-ENTMCNC: 31.7 G/DL (ref 31.4–35)
MCV RBC AUTO: 95.6 FL (ref 79.6–97.8)
MM INDURATION POC: 0 MM (ref 0–5)
MONOCYTES # BLD: 0.9 K/UL (ref 0.1–1.3)
MONOCYTES NFR BLD: 9 % (ref 4–12)
NEUTS SEG # BLD: 6.5 K/UL (ref 1.7–8.2)
NEUTS SEG NFR BLD: 66 % (ref 43–78)
NRBC # BLD: 0 K/UL (ref 0–0.2)
PLATELET # BLD AUTO: 390 K/UL (ref 150–450)
PMV BLD AUTO: 9.4 FL (ref 9.4–12.3)
POTASSIUM SERPL-SCNC: 3.7 MMOL/L (ref 3.5–5.1)
PPD POC: NEGATIVE NEGATIVE
PROT SERPL-MCNC: 6.5 G/DL (ref 6.3–8.2)
RBC # BLD AUTO: 4.32 M/UL (ref 4.05–5.2)
SODIUM SERPL-SCNC: 139 MMOL/L (ref 136–145)
WBC # BLD AUTO: 9.8 K/UL (ref 4.3–11.1)

## 2022-01-11 PROCEDURE — 74011250636 HC RX REV CODE- 250/636: Performed by: FAMILY MEDICINE

## 2022-01-11 PROCEDURE — 74011250637 HC RX REV CODE- 250/637: Performed by: FAMILY MEDICINE

## 2022-01-11 PROCEDURE — 85025 COMPLETE CBC W/AUTO DIFF WBC: CPT

## 2022-01-11 PROCEDURE — 36415 COLL VENOUS BLD VENIPUNCTURE: CPT

## 2022-01-11 PROCEDURE — 74011250637 HC RX REV CODE- 250/637: Performed by: STUDENT IN AN ORGANIZED HEALTH CARE EDUCATION/TRAINING PROGRAM

## 2022-01-11 PROCEDURE — 80053 COMPREHEN METABOLIC PANEL: CPT

## 2022-01-11 PROCEDURE — 74011000250 HC RX REV CODE- 250: Performed by: STUDENT IN AN ORGANIZED HEALTH CARE EDUCATION/TRAINING PROGRAM

## 2022-01-11 PROCEDURE — 74011000258 HC RX REV CODE- 258: Performed by: FAMILY MEDICINE

## 2022-01-11 RX ORDER — DILTIAZEM HYDROCHLORIDE 180 MG/1
180 CAPSULE, COATED, EXTENDED RELEASE ORAL DAILY
Qty: 30 CAPSULE | Refills: 0 | Status: SHIPPED | OUTPATIENT
Start: 2022-01-11 | End: 2022-02-10

## 2022-01-11 RX ORDER — DOXYCYCLINE 100 MG/1
100 CAPSULE ORAL EVERY 12 HOURS
Qty: 6 CAPSULE | Refills: 0 | Status: SHIPPED | OUTPATIENT
Start: 2022-01-11 | End: 2022-01-14

## 2022-01-11 RX ORDER — CEFPODOXIME PROXETIL 200 MG/1
200 TABLET, FILM COATED ORAL 2 TIMES DAILY
Qty: 6 TABLET | Refills: 0 | Status: SHIPPED | OUTPATIENT
Start: 2022-01-11 | End: 2022-01-14

## 2022-01-11 RX ADMIN — ATORVASTATIN CALCIUM 10 MG: 10 TABLET, FILM COATED ORAL at 08:52

## 2022-01-11 RX ADMIN — DOXYCYCLINE HYCLATE 100 MG: 100 CAPSULE ORAL at 08:52

## 2022-01-11 RX ADMIN — DIGOXIN 0.06 MG: 125 TABLET ORAL at 08:52

## 2022-01-11 RX ADMIN — LEVOTHYROXINE SODIUM 50 MCG: 0.05 TABLET ORAL at 05:41

## 2022-01-11 RX ADMIN — CEFEPIME 2 G: 2 INJECTION, POWDER, FOR SOLUTION INTRAVENOUS at 08:53

## 2022-01-11 RX ADMIN — FUROSEMIDE 40 MG: 10 INJECTION, SOLUTION INTRAMUSCULAR; INTRAVENOUS at 08:52

## 2022-01-11 RX ADMIN — SODIUM CHLORIDE, PRESERVATIVE FREE 5 ML: 5 INJECTION INTRAVENOUS at 05:41

## 2022-01-11 RX ADMIN — PANTOPRAZOLE SODIUM 40 MG: 40 TABLET, DELAYED RELEASE ORAL at 05:41

## 2022-01-11 RX ADMIN — RIVAROXABAN 15 MG: 15 TABLET, FILM COATED ORAL at 08:00

## 2022-01-11 RX ADMIN — METOPROLOL TARTRATE 100 MG: 50 TABLET, FILM COATED ORAL at 08:52

## 2022-01-11 RX ADMIN — DILTIAZEM HYDROCHLORIDE 30 MG: 30 TABLET, FILM COATED ORAL at 05:41

## 2022-01-11 NOTE — PROGRESS NOTES
Problem: Pressure Injury - Risk of  Goal: *Prevention of pressure injury  Description: Document Brennan Scale and appropriate interventions in the flowsheet. Outcome: Progressing Towards Goal  Note: Pressure Injury Interventions:  Sensory Interventions: Assess changes in LOC,Assess need for specialty bed,Check visual cues for pain,Discuss PT/OT consult with provider,Keep linens dry and wrinkle-free,Pad between skin to skin,Pressure redistribution bed/mattress (bed type)    Moisture Interventions: Absorbent underpads,Apply protective barrier, creams and emollients,Limit adult briefs,Internal/External urinary devices    Activity Interventions: Increase time out of bed,Pressure redistribution bed/mattress(bed type),PT/OT evaluation    Mobility Interventions: HOB 30 degrees or less,Pressure redistribution bed/mattress (bed type),PT/OT evaluation    Nutrition Interventions: Document food/fluid/supplement intake    Friction and Shear Interventions: Apply protective barrier, creams and emollients,Foam dressings/transparent film/skin sealants                Problem: Patient Education: Go to Patient Education Activity  Goal: Patient/Family Education  Outcome: Progressing Towards Goal     Problem: Falls - Risk of  Goal: *Absence of Falls  Description: Document Chiki Fall Risk and appropriate interventions in the flowsheet.   Outcome: Progressing Towards Goal  Note: Fall Risk Interventions:  Mobility Interventions: Bed/chair exit alarm,OT consult for ADLs,Patient to call before getting OOB,PT Consult for mobility concerns    Mentation Interventions: Adequate sleep, hydration, pain control,Bed/chair exit alarm,Door open when patient unattended,Increase mobility,More frequent rounding,Reorient patient    Medication Interventions: Bed/chair exit alarm,Evaluate medications/consider consulting pharmacy    Elimination Interventions: Bed/chair exit alarm,Call light in reach,Stay With Me (per policy),Patient to call for help with toileting needs    History of Falls Interventions: Bed/chair exit alarm,Door open when patient unattended,Investigate reason for fall         Problem: Patient Education: Go to Patient Education Activity  Goal: Patient/Family Education  Outcome: Progressing Towards Goal     Problem: Patient Education: Go to Patient Education Activity  Goal: Patient/Family Education  Outcome: Progressing Towards Goal     Problem: Pneumonia: Day 3  Goal: Activity/Safety  Outcome: Progressing Towards Goal  Goal: Consults, if ordered  Outcome: Progressing Towards Goal  Goal: Diagnostic Test/Procedures  Outcome: Progressing Towards Goal  Goal: Nutrition/Diet  Outcome: Progressing Towards Goal  Goal: Discharge Planning  Outcome: Progressing Towards Goal  Goal: Medications  Outcome: Progressing Towards Goal  Goal: Respiratory  Outcome: Progressing Towards Goal  Goal: Treatments/Interventions/Procedures  Outcome: Progressing Towards Goal  Goal: Psychosocial  Outcome: Progressing Towards Goal  Goal: *Oxygen saturation within defined limits  Outcome: Progressing Towards Goal  Goal: *Hemodynamically stable  Outcome: Progressing Towards Goal  Goal: *Demonstrates progressive activity  Outcome: Progressing Towards Goal  Goal: *Tolerating diet  Outcome: Progressing Towards Goal  Goal: *Describes available resources and support systems  Outcome: Progressing Towards Goal  Goal: *Optimal pain control at patient's stated goal  Outcome: Progressing Towards Goal

## 2022-01-11 NOTE — PROGRESS NOTES
Attempted to call report to [facility], unit secretary ensured she would get nurses to call back. No call back, attempted to call a second time and was told \"you'll have to call back\" followed by facility hanging up. Patient otherwise packed and ready to go.

## 2022-01-12 LAB
BACTERIA SPEC CULT: NORMAL
BACTERIA SPEC CULT: NORMAL
SERVICE CMNT-IMP: NORMAL
SERVICE CMNT-IMP: NORMAL

## 2022-01-16 NOTE — PROGRESS NOTES
Physician Progress Note      Ling Landry  CSN #:                  [id number]  :                       3/11/1923  ADMIT DATE:       2022 10:48 AM  DISCH DATE:        2022 11:23 AM  RESPONDING  PROVIDER #:        Ender Mccord MD          QUERY TEXT:    Patient with a history of DM and CHF was admitted with PNA and treated with Cefepime. They were discharged on Vantin and Doxycycline. Based on clinical indicators and treatment, can the suspected type of pneumonia be further specified as: The medical record reflects the following:  Risk Factors: 89 yr, Hx DM, CHF  Clinical Indicators: Chest x-ray showed increasing bilateral infiltrates,  Treatment: Cefepime, discharged on Vantin and Doxycycline  Options provided:  -- Gram negative pneumonia(excluding Haemophilus influenza)  -- Other - I will add my own diagnosis  -- Disagree - Not applicable / Not valid  -- Disagree - Clinically unable to determine / Unknown  -- Refer to Clinical Documentation Reviewer    PROVIDER RESPONSE TEXT:    This patient has gram negative pneumonia (excluding Haemophilus influenza).     Query created by: Elizabet Rodarte on 2022 4:12 PM      Electronically signed by:  Ender Mccord MD 2022 12:20 PM

## 2022-03-01 ENCOUNTER — HOSPITAL ENCOUNTER (EMERGENCY)
Age: 87
Discharge: HOME | End: 2022-03-01
Attending: EMERGENCY MEDICINE
Payer: MEDICARE

## 2022-03-01 ENCOUNTER — APPOINTMENT (OUTPATIENT)
Dept: GENERAL RADIOLOGY | Age: 87
End: 2022-03-01
Attending: EMERGENCY MEDICINE
Payer: MEDICARE

## 2022-03-01 ENCOUNTER — APPOINTMENT (OUTPATIENT)
Dept: CT IMAGING | Age: 87
End: 2022-03-01
Attending: EMERGENCY MEDICINE
Payer: MEDICARE

## 2022-03-01 VITALS
HEIGHT: 66 IN | OXYGEN SATURATION: 95 % | WEIGHT: 165 LBS | SYSTOLIC BLOOD PRESSURE: 143 MMHG | RESPIRATION RATE: 16 BRPM | TEMPERATURE: 98.3 F | HEART RATE: 60 BPM | DIASTOLIC BLOOD PRESSURE: 60 MMHG | BODY MASS INDEX: 26.52 KG/M2
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
## 2022-03-01 DIAGNOSIS — S00.83XA CONTUSION OF FOREHEAD, INITIAL ENCOUNTER: ICD-10-CM

## 2022-03-01 DIAGNOSIS — S80.01XA CONTUSION OF RIGHT KNEE, INITIAL ENCOUNTER: ICD-10-CM

## 2022-03-01 PROCEDURE — 99284 EMERGENCY DEPT VISIT MOD MDM: CPT

## 2022-03-01 PROCEDURE — 73560 X-RAY EXAM OF KNEE 1 OR 2: CPT

## 2022-03-01 PROCEDURE — 70450 CT HEAD/BRAIN W/O DYE: CPT

## 2022-03-01 NOTE — ED NOTES
Attempted to call [address] [facility] for report, went to voicemail. Message left for RN to call back for report.

## 2022-03-01 NOTE — DISCHARGE INSTRUCTIONS
Return with any fevers, vomiting, confusion, worsening symptoms, or additional concerns. Follow-up with your regular doctor for reevaluation as needed.

## 2022-03-01 NOTE — ED TRIAGE NOTES
Pt to ED via ems from [address] assisted living after rolling out of bed. Pt c/o right lower back and right knee pain. Pt noted to have abrasion to forehead. Pt denies headache. Per ems pt noted to be on xarelto. Pt alert and in NAD at this time.

## 2022-03-01 NOTE — ED PROVIDER NOTES
80-year-old lady presents with concerns about falling out of bed. She has some pain in her knee, low back, and a contusion on her forehead. She denies loss of consciousness. She has been up moving since she fell but she does have pain in her leg when she does that. She denies any chest or abdominal pain. No other associated symptoms. Elements of this note were created using speech recognition software. As such, errors of speech recognition may be present.            Past Medical History:   Diagnosis Date    Acute kidney injury ([facility] [address] 12/29/2013    Atrial fibrillation (HCC)     pacemaker    Bell's palsy     Benign hypertensive heart disease without heart failure     Bowel obstruction (HCC) 8/20/2011    Congestive heart failure, unspecified     Diverticulosis     Flat foot(734)     Hemorrhage of rectum and anus     Hiatal hernia     Hyperlipidemia     Hypertension     Hypertonicity of bladder     Hypothyroidism     Loss of height     Menopause     Osteoporosis     Reflux esophagitis     Restless legs syndrome (RLS)     Slow transit constipation     Stricture and stenosis of esophagus     Unspecified gastritis and gastroduodenitis without mention of hemorrhage     Unspecified hemorrhoids with other complication     Unspecified hereditary and idiopathic peripheral neuropathy     Urge incontinence        Past Surgical History:   Procedure Laterality Date    HX APPENDECTOMY      HX CHOLECYSTECTOMY      HX HEENT      HX HYSTERECTOMY      HX PACEMAKER      HX TONSILLECTOMY      NEUROLOGICAL PROCEDURE UNLISTED      carpal tunnel both hands    CT BREAST SURGERY PROCEDURE UNLISTED      cyst         Family History:   Problem Relation Age of Onset    Cancer Mother         Pancreatic    Heart Disease Mother     Heart Disease Father     Stroke Other     Diabetes Other     Cancer Other     Depression Child        Social History     Socioeconomic History    Marital status:  Spouse name: Not on file    Number of children: Not on file    Years of education: Not on file    Highest education level: Not on file   Occupational History    Not on file   Tobacco Use    Smoking status: Never Smoker    Smokeless tobacco: Never Used   Vaping Use    Vaping Use: Never used   Substance and Sexual Activity    Alcohol use: No    Drug use: No    Sexual activity: Not Currently   Other Topics Concern    Not on file   Social History Narrative    Not on file     Social Determinants of Health     Financial Resource Strain:     Difficulty of Paying Living Expenses: Not on file   Food Insecurity:     Worried About Running Out of Food in the Last Year: Not on file    Louis of Food in the Last Year: Not on file   Transportation Needs:     Lack of Transportation (Medical): Not on file    Lack of Transportation (Non-Medical): Not on file   Physical Activity:     Days of Exercise per Week: Not on file    Minutes of Exercise per Session: Not on file   Stress:     Feeling of Stress : Not on file   Social Connections:     Frequency of Communication with Friends and Family: Not on file    Frequency of Social Gatherings with Friends and Family: Not on file    Attends Zoroastrianism Services: Not on file    Active Member of [address] [place] [address] [employer] or Organizations: Not on file    Attends Club or Organization Meetings: Not on file    Marital Status: Not on file   Intimate Partner Violence:     Fear of Current or Ex-Partner: Not on file    Emotionally Abused: Not on file    Physically Abused: Not on file    Sexually Abused: Not on file   Housing Stability:     Unable to Pay for Housing in the Last Year: Not on file    Number of [place] in the Last Year: Not on file    Unstable Housing in the Last Year: Not on file         ALLERGIES: Erythromycin and Sulfa (sulfonamide antibiotics)    Review of Systems   Constitutional: Negative for chills and fever.    Respiratory: Negative for cough and shortness of breath. Gastrointestinal: Negative for nausea and vomiting. Musculoskeletal: Positive for arthralgias and myalgias. Skin: Positive for color change. Negative for wound. Neurological: Negative for dizziness and headaches. Vitals:    03/01/22 0054 03/01/22 0339   BP: (!) 145/61 (!) 150/60   Pulse: 63 60   Resp: 19 16   Temp: 98.7 °F (37.1 °C) 98.3 °F (36.8 °C)   SpO2: 93% 97%   Weight: 74.8 kg (165 lb)    Height: 5' 6\" (1.676 m)             Physical Exam  Vitals and nursing note reviewed. Constitutional:       Appearance: Normal appearance. HENT:      Head:      Comments: Contusion in the middle of her forehead     Nose: Nose normal.   Eyes:      General:         Right eye: No discharge. Left eye: No discharge. Cardiovascular:      Rate and Rhythm: Normal rate. Pulmonary:      Breath sounds: Normal breath sounds. Abdominal:      Palpations: Abdomen is soft. Tenderness: There is no abdominal tenderness. Musculoskeletal:         General: No tenderness, deformity or signs of injury. Right lower leg: Edema present. Left lower leg: Edema present. Comments: 1+ edema both lower legs    She has no obvious deformity or ligamentous instability to either leg. She has full range of motion in the right knee with minimal pain. She has rotation and flexion of both hips without pain. She has no midline bony lumbar tenderness. She does have some right paraspinal muscle tenderness   Neurological:      General: No focal deficit present. Mental Status: She is alert. MDM  Number of Diagnoses or Management Options  Contusion of forehead, initial encounter  Contusion of right knee, initial encounter  Fall, initial encounter  Diagnosis management comments: Her knee x-ray is negative. Her head CT scan is negative. Her back pain seems more musculoskeletal she does not have any midline bony tenderness.   I do not think she had any urgent or emergent and I will discharge her home.          Procedures

## 2022-03-01 NOTE — ED NOTES
Pt ambulatory to restroom with 2 assist. Tolerated well, urinated and ambulatory back with 2 assist. Given water, vitals checked

## 2022-03-01 NOTE — ED NOTES
I have reviewed discharge instructions with the caregiver. The caregiver verbalized understanding. Patient left ED via Discharge Method: stretcher to SNF with [facility]. Opportunity for questions and clarification provided. Patient given 0 scripts. To continue your aftercare when you leave the hospital, you may receive an automated call from our care team to check in on how you are doing. This is a free service and part of our promise to provide the best care and service to meet your aftercare needs.  If you have questions, or wish to unsubscribe from this service please call [phone]. Thank you for Choosing our [facility] Emergency Department.

## 2022-03-18 PROBLEM — J18.9 CAP (COMMUNITY ACQUIRED PNEUMONIA): Status: ACTIVE | Noted: 2022-01-07

## 2022-03-18 PROBLEM — R53.1 WEAKNESS GENERALIZED: Status: ACTIVE | Noted: 2022-01-07

## 2022-03-19 PROBLEM — M81.0 SENILE OSTEOPOROSIS: Status: ACTIVE | Noted: 2017-10-05

## 2022-03-20 PROBLEM — E11.9 DM2 (DIABETES MELLITUS, TYPE 2) (HCC): Status: ACTIVE | Noted: 2022-01-07

## 2022-03-20 PROBLEM — S31.000A SACRAL WOUND: Status: ACTIVE | Noted: 2022-01-07

## 2022-05-24 ENCOUNTER — TELEPHONE (OUTPATIENT)
Dept: INTERNAL MEDICINE CLINIC | Facility: CLINIC | Age: 87
End: 2022-05-24

## 2022-05-24 NOTE — TELEPHONE ENCOUNTER
Please call patient's dtr and reschedule her appt as a doxy. You can change it to 130 appt slot so I can see them at the scheduled time. We can discuss whether something like Fosamax or Boniva would be good alternatives for [facility] at this stage. Marina Jolley MD         From: Anthony Decree  To: Dr. Alfred Pena: 5/24/2022 12:13 PM EDT  Subject: [place] Shot for Tara Weir Dr. Henok Harper:  Since Mom is enrolled in [address] [place] [address]. in [facility], they will not pay for preventive care such as the Prolia Shot. Frankly, paying for this shot out-of-pocket would be exorbitant for her resources. Can you suggest a cheaper Rx to give her some protection for her bone health. She is taking one Caltrate Chewable Calcium tablet (600 mg + D) daily at present. Mom does not attempt to walk outside of her room using her walker. She goes to her meals in a wheelchair. She walks a limited number of steps a day in her room using her walker. She is scheduled for a visit in your office next Tuesday (May 31) at 2:30 pm, but I would like to do an E-Visit because of her waning energy and the stress of being transported. I can be be in her room with her at [address] that day at whatever time you are available. Thank you for your help!   Armando Orona

## 2022-05-25 ENCOUNTER — TELEPHONE (OUTPATIENT)
Dept: INTERNAL MEDICINE CLINIC | Facility: CLINIC | Age: 87
End: 2022-05-25

## 2022-05-25 DIAGNOSIS — Z79.899 ENCOUNTER FOR ONGOING OSTEOPOROSIS THERAPY, NON-BISPHOSPHONATES: ICD-10-CM

## 2022-05-25 DIAGNOSIS — M81.0 AGE-RELATED OSTEOPOROSIS WITHOUT CURRENT PATHOLOGICAL FRACTURE: ICD-10-CM

## 2022-05-25 DIAGNOSIS — M81.0 ENCOUNTER FOR ONGOING OSTEOPOROSIS THERAPY, NON-BISPHOSPHONATES: ICD-10-CM

## 2022-05-25 DIAGNOSIS — M81.0 ENCOUNTER FOR ONGOING OSTEOPOROSIS BISPHOSPHONATE THERAPY: Primary | ICD-10-CM

## 2022-05-25 DIAGNOSIS — Z79.83 ENCOUNTER FOR ONGOING OSTEOPOROSIS BISPHOSPHONATE THERAPY: Primary | ICD-10-CM

## 2022-05-25 NOTE — TELEPHONE ENCOUNTER
Pt daughter called back and report she did research on fosamax and boniva and does not want her to start on either. Pt daughter is requesting to start the pt on prolia. If agreed please send in rx to pt pharmacy.

## 2022-05-25 NOTE — TELEPHONE ENCOUNTER
Will send in rx. She has already been getting with us here so hopefully it will be covered enough for here. Do I send it to [facility] or a different specialty pharmacy?   Ashok Headley MD

## 2022-05-29 ENCOUNTER — PATIENT MESSAGE (OUTPATIENT)
Dept: INTERNAL MEDICINE CLINIC | Facility: CLINIC | Age: 87
End: 2022-05-29

## 2022-05-29 DIAGNOSIS — M81.0 AGE-RELATED OSTEOPOROSIS WITHOUT CURRENT PATHOLOGICAL FRACTURE: ICD-10-CM

## 2022-05-29 DIAGNOSIS — Z91.89 AT RISK OF FRACTURE DUE TO OSTEOPOROSIS: Primary | ICD-10-CM

## 2022-05-29 DIAGNOSIS — M81.0 AT RISK OF FRACTURE DUE TO OSTEOPOROSIS: Primary | ICD-10-CM

## 2022-05-31 ENCOUNTER — TELEMEDICINE (OUTPATIENT)
Dept: INTERNAL MEDICINE CLINIC | Facility: CLINIC | Age: 87
End: 2022-05-31
Payer: MEDICARE

## 2022-05-31 DIAGNOSIS — I50.22 CHRONIC SYSTOLIC HEART FAILURE (HCC): ICD-10-CM

## 2022-05-31 DIAGNOSIS — E11.9 TYPE 2 DIABETES MELLITUS WITHOUT COMPLICATION, WITHOUT LONG-TERM CURRENT USE OF INSULIN (HCC): ICD-10-CM

## 2022-05-31 DIAGNOSIS — I48.0 PAROXYSMAL ATRIAL FIBRILLATION (HCC): ICD-10-CM

## 2022-05-31 DIAGNOSIS — N18.31 STAGE 3A CHRONIC KIDNEY DISEASE (HCC): ICD-10-CM

## 2022-05-31 DIAGNOSIS — M81.0 AGE-RELATED OSTEOPOROSIS WITHOUT CURRENT PATHOLOGICAL FRACTURE: Primary | ICD-10-CM

## 2022-05-31 PROCEDURE — 1123F ACP DISCUSS/DSCN MKR DOCD: CPT | Performed by: INTERNAL MEDICINE

## 2022-05-31 PROCEDURE — G8427 DOCREV CUR MEDS BY ELIG CLIN: HCPCS | Performed by: INTERNAL MEDICINE

## 2022-05-31 PROCEDURE — 99213 OFFICE O/P EST LOW 20 MIN: CPT | Performed by: INTERNAL MEDICINE

## 2022-05-31 PROCEDURE — 1090F PRES/ABSN URINE INCON ASSESS: CPT | Performed by: INTERNAL MEDICINE

## 2022-05-31 NOTE — TELEPHONE ENCOUNTER
From: Charmaine Baker  To: Dr. Mills Degree: 5/29/2022 11:22 PM EDT  Subject: Prolia shot for Ezra Sexton update     Dr Bae Diamond:   I spoke with Medicare on Saturday and found out that the Prolia shot will be covered by Medicare under Part B. The Rx should be sent to [facility]  which serves [address] [place] [address] in [address] [place] [address],[address] customer # is [id number]. The website: [email or web address]  A nurse from [facility] will administer the shot to her at [facility]. I am overjoyed to relay this info to you! Thank you for your care and concern for my mom!   Candi De Leon

## 2022-05-31 NOTE — PROGRESS NOTES
Jennifer Ryder (:  3/11/1923) is a Established patient, here for evaluation of the following:    Assessment & Plan   Below is the assessment and plan developed based on review of pertinent history, physical exam, labs, studies, and medications. 1. Age-related osteoporosis without current pathological fracture  2. Chronic systolic heart failure (HCC)  3. Stage 3a chronic kidney disease (HCC)  4. Paroxysmal atrial fibrillation ([facility] [address]  5. Type 2 diabetes mellitus without complication, without long-term current use of insulin (HCC)    Return for routine follow up of chronic medical issues. Subjective   Accompanied by dtr who assists in providing history. She is currently also under the care of [address] [place] [address]. in [facility],   Chronic active medical issues Afib, CAD, HLD, HTN, CHF, hypothyroidism, osteoporosis. She is due for her Prolia injection today for osteoporosis. She continues with vit D and calcium for bone health. Tolerating prolia. Dtr wants her to continue on Prolia to prevent fractures from falls. I will need to order it for her.       Current Outpatient Medications:  acetaminophen (TYLENOL) 500 MG tablet, Take 500 mg by mouth every 6 hours as needed, Disp: , Rfl:   albuterol (PROVENTIL) (2.5 MG/3ML) 0.083% nebulizer solution, Inhale 1.25 mg into the lungs 2 times daily, Disp: , Rfl:   azelastine (ASTELIN) 0.1 % nasal spray, 1 spray by Nasal route 2 times daily, Disp: , Rfl:   calcium carbonate 1500 (600 Ca) MG TABS tablet, Take 600 mg by mouth daily, Disp: , Rfl:   digoxin (LANOXIN) 125 MCG tablet, 0.125 mg, Disp: , Rfl:   docusate (COLACE, DULCOLAX) 100 MG CAPS, TAKE ONE CAPSULE BY MOUTH TWICE A DAY, Disp: , Rfl:   fexofenadine (ALLEGRA) 180 MG tablet, Take 180 mg by mouth daily, Disp: , Rfl:   furosemide (LASIX) 40 MG tablet, Take 40 mg by mouth daily, Disp: , Rfl:   levothyroxine (SYNTHROID) 50 MCG tablet, TAKE 1 TABLET BY MOUTH DAILY, Disp: , Rfl:   metoprolol tartrate (LOPRESSOR) 25 MG tablet, Take 75 mg by mouth 2 times daily, Disp: , Rfl:   omeprazole (PRILOSEC) 40 MG delayed release capsule, TAKE ONE CAPSULE BY MOUTH EVERY DAY, Disp: , Rfl:   polyethylene glycol (GLYCOLAX) 17 GM/SCOOP powder, Take 17 g by mouth, Disp: , Rfl:   rivaroxaban (XARELTO) 15 MG TABS tablet, Take 15 mg by mouth daily, Disp: , Rfl:   denosumab (PROLIA) 60 MG/ML SOSY SC injection, Inject 1 mL into the skin once for 1 dose Repeat dose in 6 months to help prevent fractures. , Disp: 180 mL, Rfl: 1    No current facility-administered medications for this visit. Review of Systems   Gastrointestinal: Negative for constipation and diarrhea. Genitourinary: Negative for difficulty urinating. Musculoskeletal: Positive for arthralgias. Neurological: Positive for weakness (generalized). Objective   Patient-Reported Vitals  No data recorded     Physical Exam  Vitals and nursing note reviewed. Constitutional:       General: She is not in acute distress. Appearance: She is not ill-appearing. Pulmonary:      Effort: Pulmonary effort is normal.   Neurological:      Mental Status: She is alert. Psychiatric:         Mood and Affect: Mood normal.              On this date 5/31/2022 I have spent 20 minutes reviewing previous notes, test results and face to face (virtual) with the patient discussing the diagnosis and importance of compliance with the treatment plan as well as documenting on the day of the visit. Ritika Miller, was evaluated through a synchronous (real-time) audio-video encounter. The patient (or guardian if applicable) is aware that this is a billable service, which includes applicable co-pays. This Virtual Visit was conducted with patient's (and/or legal guardian's) consent.  The visit was conducted pursuant to the emergency declaration under the [facility] waiver authority and the Coronavirus Preparedness and Response Supplemental Appropriations Act. Patient identification was verified, and a caregiver was present when appropriate. The patient was located at Home: [address] [place] [address]. Provider was located at [place] [address] (Appt Dept): Lois Camara [address] [place] [address].         --Yong Winslow MD

## 2022-06-06 PROBLEM — I50.22 CHRONIC SYSTOLIC HEART FAILURE (HCC): Status: ACTIVE | Noted: 2022-06-06

## 2022-06-06 PROBLEM — N18.30 CHRONIC RENAL DISEASE, STAGE III (HCC): Status: ACTIVE | Noted: 2022-06-06

## 2022-06-06 ASSESSMENT — ENCOUNTER SYMPTOMS
CONSTIPATION: 0
DIARRHEA: 0